# Patient Record
Sex: FEMALE | Race: WHITE | Employment: UNEMPLOYED | URBAN - METROPOLITAN AREA
[De-identification: names, ages, dates, MRNs, and addresses within clinical notes are randomized per-mention and may not be internally consistent; named-entity substitution may affect disease eponyms.]

---

## 2022-01-05 ENCOUNTER — HOSPITAL ENCOUNTER (OUTPATIENT)
Dept: PREADMISSION TESTING | Age: 57
Discharge: HOME | End: 2022-01-05
Payer: COMMERCIAL

## 2022-01-05 PROCEDURE — U0003 INFECTIOUS AGENT DETECTION BY NUCLEIC ACID (DNA OR RNA); SEVERE ACUTE RESPIRATORY SYNDROME CORONAVIRUS 2 (SARS-COV-2) (CORONAVIRUS DISEASE [COVID-19]), AMPLIFIED PROBE TECHNIQUE, MAKING USE OF HIGH THROUGHPUT TECHNOLOGIES AS DESCRIBED BY CMS-2020-01-R: HCPCS

## 2022-01-05 RX ORDER — ATROPINE SULFATE 0.1 MG/ML
0.5 INJECTION INTRAVENOUS
Status: CANCELLED | OUTPATIENT
Start: 2022-01-05 | End: 2022-01-06

## 2022-01-05 RX ORDER — DEXTROMETHORPHAN/PSEUDOEPHED 2.5-7.5/.8
1.2 DROPS ORAL
Status: CANCELLED | OUTPATIENT
Start: 2022-01-05

## 2022-01-05 RX ORDER — SODIUM CHLORIDE 0.9 % (FLUSH) 0.9 %
5-40 SYRINGE (ML) INJECTION AS NEEDED
Status: CANCELLED | OUTPATIENT
Start: 2022-01-05

## 2022-01-05 RX ORDER — SODIUM CHLORIDE 0.9 % (FLUSH) 0.9 %
5-40 SYRINGE (ML) INJECTION EVERY 8 HOURS
Status: CANCELLED | OUTPATIENT
Start: 2022-01-05

## 2022-01-05 RX ORDER — DIPHENHYDRAMINE HYDROCHLORIDE 50 MG/ML
50 INJECTION, SOLUTION INTRAMUSCULAR; INTRAVENOUS ONCE
Status: CANCELLED | OUTPATIENT
Start: 2022-01-05 | End: 2022-01-05

## 2022-01-05 RX ORDER — EPINEPHRINE 0.1 MG/ML
1 INJECTION INTRACARDIAC; INTRAVENOUS
Status: CANCELLED | OUTPATIENT
Start: 2022-01-05 | End: 2022-01-06

## 2022-01-07 LAB — SARS-COV-2, NAA: NOT DETECTED

## 2022-01-10 ENCOUNTER — HOSPITAL ENCOUNTER (OUTPATIENT)
Age: 57
Setting detail: OUTPATIENT SURGERY
Discharge: HOME | End: 2022-01-10
Attending: INTERNAL MEDICINE | Admitting: INTERNAL MEDICINE
Payer: COMMERCIAL

## 2022-01-10 VITALS
RESPIRATION RATE: 16 BRPM | HEART RATE: 109 BPM | OXYGEN SATURATION: 99 % | SYSTOLIC BLOOD PRESSURE: 114 MMHG | WEIGHT: 174.5 LBS | TEMPERATURE: 97.7 F | BODY MASS INDEX: 28.04 KG/M2 | DIASTOLIC BLOOD PRESSURE: 52 MMHG | HEIGHT: 66 IN

## 2022-01-10 LAB — GLUCOSE BLD STRIP.AUTO-MCNC: 134 MG/DL (ref 70–110)

## 2022-01-10 PROCEDURE — 74011250636 HC RX REV CODE- 250/636: Performed by: INTERNAL MEDICINE

## 2022-01-10 PROCEDURE — 76040000007: Performed by: INTERNAL MEDICINE

## 2022-01-10 PROCEDURE — 88302 TISSUE EXAM BY PATHOLOGIST: CPT

## 2022-01-10 PROCEDURE — G0500 MOD SEDAT ENDO SERVICE >5YRS: HCPCS | Performed by: INTERNAL MEDICINE

## 2022-01-10 PROCEDURE — 77030013991 HC SNR POLYP ENDOSC BSC -A: Performed by: INTERNAL MEDICINE

## 2022-01-10 PROCEDURE — 77030039961 HC KT CUST COLON BSC -D: Performed by: INTERNAL MEDICINE

## 2022-01-10 PROCEDURE — 2709999900 HC NON-CHARGEABLE SUPPLY: Performed by: INTERNAL MEDICINE

## 2022-01-10 PROCEDURE — 88305 TISSUE EXAM BY PATHOLOGIST: CPT

## 2022-01-10 PROCEDURE — 99153 MOD SED SAME PHYS/QHP EA: CPT | Performed by: INTERNAL MEDICINE

## 2022-01-10 PROCEDURE — 77030040361 HC SLV COMPR DVT MDII -B: Performed by: INTERNAL MEDICINE

## 2022-01-10 PROCEDURE — 82962 GLUCOSE BLOOD TEST: CPT

## 2022-01-10 RX ORDER — MIDAZOLAM HYDROCHLORIDE 1 MG/ML
.25-5 INJECTION, SOLUTION INTRAMUSCULAR; INTRAVENOUS
Status: DISCONTINUED | OUTPATIENT
Start: 2022-01-10 | End: 2022-01-10 | Stop reason: HOSPADM

## 2022-01-10 RX ORDER — NALOXONE HYDROCHLORIDE 0.4 MG/ML
0.4 INJECTION, SOLUTION INTRAMUSCULAR; INTRAVENOUS; SUBCUTANEOUS
Status: DISCONTINUED | OUTPATIENT
Start: 2022-01-10 | End: 2022-01-10 | Stop reason: HOSPADM

## 2022-01-10 RX ORDER — FLUMAZENIL 0.1 MG/ML
0.2 INJECTION INTRAVENOUS
Status: DISCONTINUED | OUTPATIENT
Start: 2022-01-10 | End: 2022-01-10 | Stop reason: HOSPADM

## 2022-01-10 RX ORDER — SODIUM CHLORIDE 9 MG/ML
1000 INJECTION, SOLUTION INTRAVENOUS CONTINUOUS
Status: DISCONTINUED | OUTPATIENT
Start: 2022-01-10 | End: 2022-01-10 | Stop reason: HOSPADM

## 2022-01-10 RX ORDER — CARBIDOPA AND LEVODOPA 25; 100 MG/1; MG/1
2 TABLET ORAL 4 TIMES DAILY
COMMUNITY

## 2022-01-10 RX ORDER — FENTANYL CITRATE 50 UG/ML
100 INJECTION, SOLUTION INTRAMUSCULAR; INTRAVENOUS
Status: DISCONTINUED | OUTPATIENT
Start: 2022-01-10 | End: 2022-01-10 | Stop reason: HOSPADM

## 2022-01-10 RX ADMIN — SODIUM CHLORIDE 1000 ML: 9 INJECTION, SOLUTION INTRAVENOUS at 07:47

## 2022-01-10 NOTE — PERIOP NOTES
Face to face  Discharged  instruction given to spouse/ pt  and agreed with the plan. Discharged includes diet, activity limitations &, medication to continue . D/c to home in stable condition with care of family.

## 2022-01-10 NOTE — PROCEDURES
[facility]  Colonoscopy Procedure Report  _______________________________________________________  Patient: Veto Perales                                        Attending Physician: Timoteo Min MD    Patient ID: [id number]                                    Referring Physician: James Romeo DO    Exam Date: 1/10/2022      Introduction: A  60 y.o. female patient, presents for inpatient Colonoscopy    Indications:  History of adenoma polyp on last colonoscopy done on 4/16/2014 by Dr Deana Wilkins:  a small sessile polyp found in the descending colon removed by cold biopsy polypectomy. She has one bm every one to two days. PMH: HTN, Parkinson disease, diabetic type one on insuline, lupus rosacea. S/p one C section frozen shoulder and hammer toe surgery. She would be having her second colonoscopy. Consent: The benefits, risks, and alternatives to the procedure were discussed and informed consent was obtained from the patient. Preparation: EKG, pulse, pulse oximetry and blood pressure were monitored throughout the procedure. ASA Classification: Class II- . The heart is an S1-S2 and regular heart rate and rhythm. Lungs are clear to auscultation and percussion. Abdomen is soft, nondistended, and nontender. Mental Status: awake, alert, and oriented to person, place, and time    Medications:  · Fentanyl 200 mcg IV and Versed 5 mg IV throughout the procedure. Rectal Exam: Normal Rectal Exam. No Blood. Pathology Specimens:  1    Procedure: The colonoscope was passed with difficulty through the anus under direct visualization and advanced to the cecum and 5 cm inside the terminal ileum. Retroflexion is made in the ascending colon. The patient required positioning on the back to aid in the passage of the scope. The scope was withdrawn and the mucosa was carefully examined. The quality of the preparation was good with  Presence of moderate amount of retained thick liquid stools .  The views were very good. The patient's toleration of the procedure was good. The exam was done twice to the cecum. Total time is 29 minutes and withdrawal time is 16 minutes. Findings:    Rectum:   Small internal hemorrhoids. Sigmoid:   Tortuous and loopy sigmoid. Descending Colon:   A tiny 3 mm flat descending colon polyp, cold snared. Transverse Colon:   Normal   Ascending Colon:   Normal  Cecum:   Normal  Terminal Ileum:   Normal.       Unplanned Events: There were no unplanned events. Estimated Blood Loss: None  IMPLANTS: * No implants in log *  Impressions: Difficult colonoscopy. Small internal hemorrhoids. Tortuous and loopy sigmoid. A tiny 3 mm flat descending colon polyp, cold snared. Normal Mucosa. No blood or AVM found. Complications: None; patient tolerated the procedure well. Recommendations:  · Discharge home when standard parameters are met. · Resume a high fiber diet. · Resume own medications. Avoid all NSAID's for 3 days  · Colonoscopy recommendation in 10 years.   · Take Miralax and/ or Colace 100 mg on regular basis if constipated    Procedure Codes:    Jerry Bhavyashimon [[id number]]    Endoscope Information:  Model Number(s)    [id number]   Assistant: None  Signed By: Agus Barroso MD Date: 1/10/2022

## 2022-01-10 NOTE — DISCHARGE INSTRUCTIONS
[address] from Nurse    PATIENT INSTRUCTIONS:    After general anesthesia or intravenous sedation, for 24 hours or while taking prescription Narcotics:  · Limit your activities  · Do not drive and operate hazardous machinery  · Do not make important personal or business decisions  · Do  not drink alcoholic beverages  · If you have not urinated within 8 hours after discharge, please contact your surgeon on call. Report the following to your surgeon:  · Excessive pain, swelling, redness or odor of or around the surgical area  · Temperature over 100.5  · Nausea and vomiting lasting longer than 4 hours or if unable to take medications  · Any signs of decreased circulation or nerve impairment to extremity: change in color, persistent  numbness, tingling, coldness or increase pain  · Any questions    What to do at Home:  Recommended activity: as above ,   If you experience any of the following symptoms as above , please follow up with Doctor Nava. *  Please give a list of your current medications to your Primary Care Provider. *  Please update this list whenever your medications are discontinued, doses are      changed, or new medications (including over-the-counter products) are added. *  Please carry medication information at all times in case of emergency situations. These are general instructions for a healthy lifestyle:    No smoking/ No tobacco products/ Avoid exposure to second hand smoke  Surgeon General's Warning:  Quitting smoking now greatly reduces serious risk to your health.     Obesity, smoking, and sedentary lifestyle greatly increases your risk for illness    A healthy diet, regular physical exercise & weight monitoring are important for maintaining a healthy lifestyle    You may be retaining fluid if you have a history of heart failure or if you experience any of the following symptoms:  Weight gain of 3 pounds or more overnight or 5 pounds in a week, increased swelling in our hands or feet or shortness of breath while lying flat in bed. Please call your doctor as soon as you notice any of these symptoms; do not wait until your next office visit. The discharge information has been reviewed with the patient and spouse. The patient and spouse verbalized understanding. Discharge medications reviewed with the patient and spouse and appropriate educational materials and side effects teaching were provided. ___________________________________________________________________________________________________________________________________  [id number]  [id number]    COLON DISCHARGE INSTRUCTIONS    Discomfort:  Redness at IV site- apply warm compress to area; if redness or soreness persist- contact your physician  There may be a slight amount of blood passed from the rectum  Gaseous discomfort- walking, belching will help relieve any discomfort  You may not operate a vehicle til the next day. You may not engage in an occupation involving machinery or appliances til the next day. You may not drink alcoholic beverages til the next day. DIET:   High fiber diet. ACTIVITY:  You may not  resume your normal daily activities til the next day. it is recommended that you spend the remainder of the day resting -  avoid any strenuous activity. CALL M.D.  IF ANY SIGN OF:   Increasing pain, nausea, vomiting  Abdominal distension (swelling)  New increased bleeding (oral or rectal)  Fever (chills)  Pain in chest area  Bloody discharge from nose or mouth  Shortness of breath    You may not  take any Advil, Aspirin, Ibuprofen, Motrin, Aleve, or Goodys for 5 days, ONLY  Tylenol as needed for pain. Post procedure diagnosis: tortuous colon. A tiny 3 mm flat descending colon polyp. Follow-up Instructions: Your follow up colonoscopy will be in 10 years. We will notify you the results of your biopsy by letter within 2 weeks.     Tita Alonso MD  January 10, 2022 Patient armband removed and shredded  Patient Education        Learning About COVID-19 and Flu Symptoms  How can you tell COVID-19 from the flu? COVID-19 and the flu have similar symptoms. The two can be hard to tell apart. The only way to know for sure which illness you have is to be tested. Since the symptoms are so alike, it makes sense to act as if you have COVID-19 until your test results come back. This means staying home and limiting contact with people in your home. You'll need to wash your hands often and disinfect surfaces that you touch. And be sure to wear a mask when you're around other people. This is also good advice if you think you have the flu. COVID-19 and the flu have these symptoms in common:  · Fever or chills  · Cough  · Shortness of breath  · Fatigue (tiredness)  · Sore throat  · Runny or stuffy nose  · Muscle and body aches  · Headache  · Vomiting and diarrhea (more common in children than adults)  COVID-19 has another symptom that also may occur:  · New loss of taste or smell  COVID-19 symptoms may appear from 2 to 14 days after infection. Flu symptoms usually appear 1 to 4 days after infection. Why should you get a flu shot during the COVID-19 pandemic? It's important to get your yearly flu vaccine. Both the flu and COVID-19 can be active at the same time. You can get sick with both infections at once. And having both may make you more sick than getting just one. The flu vaccine won't protect you from COVID-19. But it can help prevent the flu or reduce its symptoms. If fewer people get very ill with the flu, this will help free up medical resources that are needed for COVID-19 patients. Where can you learn more? Go to http://www.[employer].com/  Enter C123 in the search box to learn more about \"Learning About COVID-19 and Flu Symptoms. \"  Current as of: March 26, 2021               Content Version: 13.0  © 2015-8663 Healthwise, [facility].    Care instructions adapted under license by [employer] (which disclaims liability or warranty for this information). If you have questions about a medical condition or this instruction, always ask your healthcare professional. [address] any warranty or liability for your use of this information.

## 2022-01-10 NOTE — H&P
Assessment/Plan  # Detail Type Description    1. Assessment Personal history of colonic polyps (Z86.010). Impression Colonoscopy  4/16/2014 by Dr Leal Erp: for Fx hx of colon polyps. Normal cecum, ileocecal valve, ascending colon ,hepatic flexure, transverse colon, splenic flexure, sigmoid colon ,rectosigmoid junction, rectum, and anus. a single small sessile poly found in the descending colon removed by cold biopsy polypectomy. Colon descending biopsy: adenomatous polyp. Patient Plan She has average risk for colon cancer and asymptomatic. she has HTN, Parkinson disease, diabetic type one on insuline, lupus rosacea. She would be having her second colonoscopy. I explained to her the procedure of colonoscopy and the risks involved which include but not limited to reaction to sedation, bleeding, perforation, infection or missing a lesion if bowels are not well prepped or are unusually tortuous. she agreed to proceed with the procedure and answered her questions. I gave her the approved  to clean her bowels. I advised her to take if needed extra laxatives for few days before incase she is on the constipated side to assure adequate bowel prep              This 60year old  patient was referred by Alanis Whitehead. This 60year old female presents for Hx polyp/colon cancer. History of Present Illness  1. Hx polyp/colon cancer   Prior screening:  colonoscopy. Denies risk factors. Pertinent negatives include abdominal pain, change in bowel habits, constipation, decreased appetite, diarrhea, melena, nausea, vomiting and weight loss. Additional information: No family history of colon cancer and last colonoscopy 4/16/2014. Hx of polyp adenomatous. Bm 1 x daily.           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 The patient is right-handed. Preferred language is [place]. Education/Employment/Occupation  Employment History Status Retired Restrictions    part time work       [employer] insurance       [employer]       domestic        domestic  retired       Marital Status/Family/Social Support  Marital status:      Tobacco use status: Never smoked tobacco.    Smoking status: Never smoker. Tobacco Screening  Patient has never used tobacco. Patient has not used tobacco in the last 30 days. Patient has not used smokeless tobacco in the last 30 days. Smoking Status  Type Smoking Status Usage Per Day Years Used Pack Years Total Pack Years    Never smoker         Tobacco/Vaping Exposure  No passive smoke exposure. Alcohol  There is a history of alcohol use. Type: Wine. 2 drinks consumed occasionally. Caffeine  The patient uses caffeine: coffee and tea, soda. - 4 cups a day. Lifestyle  Sedentary activity level.     Sleep Patterns  Patient has no changes to sleep patterns. Medications (active prior to today)  Medication Instructions Start Date Stop Date Refilled Elsewhere   trazodone 50 mg tablet take 1 tablet by oral route  every day at bedtime 07/20/2017   N   Sinemet 25 mg-100 mg tablet take 2 tablet by oral route 3 times every day per neuro 06/19/2018   N   Suprep Bowel Prep Kit 17.5 gram-3.13 gram-1.6 gram oral solution take first half the evening before procedure and the second half the morning of the procedure 12/10/2019   N   RAMIPRIL CAP 10MG TAKE 2 CAPSULES(20MG) DAILY 01/11/2021 01/11/2021 N   Novolog U-100 Insulin aspart 100 unit/mL subcutaneous solution using up to 100 units a day SQ in an insulin pump. 01/29/2021 01/29/2021 N   Ozempic 0.25 mg or 0.5 mg (2 mg/1.5 mL) subcutaneous pen injector inject (0.25MG)  by subcutaneous route  every week for 4 weeks then inject 0.5 mg subcutaneously once weekly using rotating injectionsites 03/08/2021 03/08/2021 N   CONTOUR NEXT PAULY STRIPS TEST 6 TIMES A DAY DUE TO  INSULIN ADJUSTMENTS 05/17/2021 05/17/2021 N   paroxetine 10 mg tablet take 1 tablet by oral route  every day 06/15/2021   N   simvastatin 40 mg tablet TAKE 1 TABLET EVERY EVENING 06/15/2021  06/15/2021 N   triamcinolone acetonide 0.025 % topical cream apply by topical route 2 times every day a thin layer to the affected area(s) 06/15/2021   N       Medication Reconciliation  Medications reconciled today. Medications (Added, Continued or Stopped today)  Start Date Medication Directions PRN Status PRN Reason Instruction Stop Date   05/17/2021 CONTOUR NEXT PAULY STRIPS TEST 6 TIMES A DAY DUE TO  INSULIN ADJUSTMENTS N      01/29/2021 Novolog U-100 Insulin aspart 100 unit/mL subcutaneous solution using up to 100 units a day SQ in an insulin pump.  N      03/08/2021 Ozempic 0.25 mg or 0.5 mg (2 mg/1.5 mL) subcutaneous pen injector inject (0.25MG)  by subcutaneous route  every week for 4 weeks then inject 0.5 mg subcutaneously once weekly using rotating injectionsites N      06/15/2021 paroxetine 10 mg tablet take 1 tablet by oral route  every day N      01/11/2021 RAMIPRIL CAP 10MG TAKE 2 CAPSULES(20MG) DAILY N      06/15/2021 simvastatin 40 mg tablet TAKE 1 TABLET EVERY EVENING N      06/19/2018 Sinemet 25 mg-100 mg tablet take 2 tablet by oral route 3 times every day per neuro N      12/10/2019 Suprep Bowel Prep Kit 17.5 gram-3.13 gram-1.6 gram oral solution take first half the evening before procedure and the second half the morning of the procedure N      07/20/2017 trazodone 50 mg tablet take 1 tablet by oral route  every day at bedtime N      06/15/2021 triamcinolone acetonide 0.025 % topical cream apply by topical route 2 times every day a thin layer to the affected area(s) N        Allergies  Ingredient Reaction (Severity) Medication Name Comment   NO KNOWN ALLERGIES        Reviewed, no changes. Orders  Status Lab Order Time Frame Comments   result received BMP     result received HEMOGLOBIN A1C     result received Urine Microalbumin Creatnine Ratio     result received Lipid Measured LDL     result received NMR     result received SGPT     result received CPK     result received HEMOGLOBIN A1C     result received Lipid Measured LDL     result received NMR     result received HEMOGLOBIN A1C     result received SGPT     result received HEMOGLOBIN A1C     result received Lipid Measured LDL     result received NMR     result received HEMOGLOBIN A1C     ordered SGPT     ordered HEMOGLOBIN A1C     ordered Urine Microalbumin Creatnine Ratio     ordered Lipid Measured LDL     ordered NMR     result received HEMOGLOBIN A1C     result received Lipid Measured LDL     scheduled Referral: Rusty Vargas MD. Evaluate and treat.      ordered CMP     ordered TSH     ordered Urine Microalbumin Creatnine Ratio     ordered HEMOGLOBIN A1C     ordered HEMOGLOBIN A1C     ordered Mammogram Screening     ordered HEMOGLOBIN A1C     result received SGPT result received CPK     result received Urine Microalbumin Creatnine Ratio     result received Lipid Measured LDL     obtained HDx Follow Up     scheduled Referral: Rah Lerner MD FA. Consult.      obtained * Screening mammography digital     scheduled *Stress Echo     ordered BMP     ordered HEMOGLOBIN A1C     result received BMP     result received HEMOGLOBIN A1C     result received Urine Microalbumin Creatinine Ratio     ordered HEMOGLOBIN A1C     ordered TSH     result received CBC, Platelet: No Differential     result received Lipase, Serum AU     result received Comp Metabolic Panel (14) AU     obtained * ULTRASOUND OF ABDOMEN  Nothing to eat or drink 8 hours prior   result received Lipid with LDL/HDL Ratio AU     result received TSH     result received Urine Microalb/Creat ratio  Fasting: No   result received Hemoglobin A1c  Fasting: No   ordered TSH     ordered Thyroxine (T4) Free, Direct     ordered Hemoglobin A1c     ordered Basic Metabolic Panel (8) AU     result received CBC With Differential/Platelet     result received TSH     result received Comp Metabolic Panel (14) AU     obtained X-RAY EXAM OF RIBS/CHEST PA MIN 3 VIEWS     result received CBC With Differential/Platelet     result received Comp Metabolic Panel (14) AU     result received Lipase, Serum AU     obtained CHEST X-RAY PA & LAT Bilateral     ordered Basic Metabolic Panel (8) AU     ordered Urine Microalb/Creat ratio     ordered Magnesium, Serum AU     ordered Thyroxine (T4) Free, Direct     ordered TSH     ordered Hemoglobin A1c     ordered Hemoglobin A1c     result received Hemoglobin A1c  Fasting: No   obtained CT ANGIOGRAPHY, CHEST     result received CBC With Differential/Platelet     result received GGT  AU     result received Comp Metabolic Panel (14) AU     ordered CHEST X-RAY PA & LAT     ordered X-RAY EXAM OF THORACIC SPINE 3 VIEWS     result received CHEST X-RAY PA & LAT     result received X-RAY EXAM THORACIC SPINE 2 VIEWS scheduled Referrals: Pulmonology. Beny Mari MD. Evaluate and treat     scheduled Referrals: Cardiology. Wing Amanda MD. Evaluate and treat     result received Creatine Kinase,Total,Serum     result received Sedimentation Rate-Westergren     result received CBC With Differential/Platelet     result received Hepatitis Panel (4)     result received Comp. Metabolic Panel (14)     result received Alk Phosphatase, Bone Specific     result received Ferritin, Serum     result received Antinuclear Ab Reflex Hamblen     ordered Sedimentation Rate-Westergren     ordered Hepatobiliary Sys Imag W/GB W/pharm Intervention ASAP    scheduled Referrals: Rheumatology. Jamal Granado MD. Evaluate and treat     result received Mitochondrial (M2) Antibody     result received Alpha-1-Antitrypsin, Serum     result received Ceruloplasmin     ordered Anti Smooth Muscle Antibody (ASMA)     result received Hepatic Function Panel (7)     scheduled ULTRASOUND OF ABDOMEN     scheduled Referrals: Gastroenterology. Mike Chen MD. Evaluate and treat     result received US EXAM, ABDOM, COMPLETE     scheduled Referrals: Rheumatology. Jamal Granado MD. Location: Rheumatology.  Evaluate and treat     ordered C-Reactive Protein     ordered Sedimentation Rate-Westergren     ordered Rheumatoid Factor     ordered Urinalysis, Complete     ordered Uric Acid, Serum AU     result received CCP Antibodies IgG/IgA     result received Antinuclear Antibodies, IFA     result received Complement C3, Serum     result received Complement C4, Serum     result received Protein and Creatinine, Random Urine     result received Uric Acid, Serum AU     result received Sedimentation Rate-Westergren     result received CRP     result received Urinalysis, Routine     result received Rheumatoid Factor     result received UA Microscopic     result received TSH  Fasting: No   result received Hemoglobin A1c  Fasting: No   result received Thyroxine (T4) Free, Direct  Fasting: No   result received Magnesium, Serum AU  Fasting: No   result received Urine Microalb/Creat ratio  Fasting: No   result received Basic Metabolic Panel (8) AU  Fasting: No   ordered Hemoglobin A1c     ordered Bone Imaging, Whole Body     result received Hepatic Function Panel (7) AU     result received Screening mammography digital     result received Hemoglobin A1c  Fasting: No   ordered Hemoglobin A1c     ordered TSH     ordered Urine Microalb/Creat ratio     ordered Magnesium, Serum AU     ordered Basic Metabolic Panel (8) AU     result received CBC With Differential/Platelet     result received TSH     ordered CBC Manual Differential     result received Lipid with LDL/HDL Ratio AU     result received Comp Metabolic Panel (14) AU     result received TSH     result received CBC With Differential/Platelet     result received Comp. Metabolic Panel (14)     result received Lipid Panel With LDL/HDL Ratio     result received Comp Metabolic Panel (14) AU     result received GGT  AU     result received Sedimentation Rate-Westergren     ordered MRI BRAIN W/O & W/DYE     scheduled Referrals: Orthopedics. [employer]. Evaluate and treat     ordered US, Extremity Not Venous     result received Urine Microalb/Creat ratio     result received Magnesium, Serum AU     result received Hemoglobin A1c     result received TSH     result received Basic Metabolic Panel (8) AU     result received Urine Microalb/Creat ratio     result received Hemoglobin A1c     scheduled Referrals: Neurology. Bee Carey MD. Evaluate and treat     ordered EMG Of ONE EXTREMITY     ordered MRI CERVICAL SPINE W/O DYE     scheduled Referrals:  Physical Therapy. Evaluate and treat     scheduled Referrals: Neurology. Evaluate and treat  [facility] for a second opinion.    result received Basic Metabolic Panel (8) AU     result received TSH     result received Hemoglobin A1c     result received Hemoglobin A1c result received Urine Microalb/Creat ratio     result received Hemoglobin A1c     result received Basic Metabolic Panel (8) AU     ordered Dexa, Bone Density Scan     result received Comp Metabolic Panel (14) AU     result received TSH     result received Hemoglobin A1c     result received Lipid with LDL/HDL Ratio AU     result received CBC With Differential/Platelet     result received Alkaline Phosphatase, S     result received GGT     result received Alk Phosphatase, Bone Specific     ordered X-RAY OF PELVIS     ordered X-RAY LOWER SPINE, 2/3 Views     result received Dexa, Bone Density Scan     scheduled Referrals: Otolaryngology. Renee Issa MD. Evaluate and treat     result received Screening mammography digital     ordered Comp Metabolic Panel (14) AU     result received X-RAY OF PELVIS     result received X-RAY LOWER SPINE, 2/3 Views     result received TSH     result received Basic Metabolic Panel (8) AU     result received Thyroxine (T4) Free, Direct     result received Hemoglobin A1c     result received Lipid Panel calc LDL     result received Comp Metabolic Panel (14) AU     result received CBC With Differential/Platelet     scheduled Referrals: Otolaryngology.  Olen Scheuermann MD. Evaluate and treat     ordered Hemoglobin A1c     ordered Urine Microalb/Creat ratio     ordered MRI BRAIN W/O & W/DYE     result received Hemoglobin A1c     result received Urine Microalb/Creat ratio     ordered Magnesium, Serum AU     ordered Hemoglobin A1c     ordered Vitamin B12     ordered TSH     ordered Basic Metabolic Panel (8) AU     ordered Hemoglobin A1c     ordered Magnesium, Serum AU     ordered Urine Microalb/Creat ratio     ordered Lipid Panel calc LDL     ordered Comp Metabolic Panel (14) AU     ordered TSH     ordered Lipid Panel calc LDL     ordered Hemoglobin A1c     ordered CBC With Differential/Platelet     ordered Magnesium, Serum AU     ordered Comp Metabolic Panel (14) AU     ordered Hemoglobin A1c ordered Referrals: Dermatology. Yumiko Young MD. Evaluate and treat     ordered Referrals: Gastroenterology. Marbin Johnson MD. Evaluate and treat     ordered Dexa, Bone Density Scan     ordered Screening Mammography Digital     result received Pap Lb, HPV-hr     result received Screening Mammography Digital     ordered GASTROENTEROLOGY PROCEDURE within 1 Month at location [facility]. The surgeon scheduled is Simona Underwood MD. An assistant has not been requested. result received Lipid Panel With LDL/HDL Ratio     result received Microalb/Creat Ratio, Randm Ur     result received Hemoglobin A1c     result received Magnesium, Serum     ordered CBC With Differential/Platelet     ordered Comp Metabolic Panel (14) AU     ordered TSH     ordered Hemoglobin A1c     ordered Lipid Panel calc LDL     result received Hemoglobin A1c     ordered Hemoglobin A1c     ordered Hemoglobin A1c     ordered TSH     ordered Comp Metabolic Panel (14) AU     ordered Magnesium, Serum AU     ordered Urine Microalb/Creat ratio     ordered Lipid Panel calc LDL     ordered Hemoglobin A1c     ordered Hemoglobin A1c     ordered Lipid Panel calc LDL     ordered Magnesium, Serum AU     ordered Urine Microalb/Creat ratio     ordered Comp Metabolic Panel (14) AU     ordered TSH     ordered CBC With Differential/Platelet     ordered Comp Metabolic Panel (14) AU     ordered TSH     ordered Lipid Panel calc LDL     ordered Urine Microalb/Creat ratio     ordered UA In Office No Micro     ordered Referrals: Gastroenterology. Evaluate and treat     result received Dexa, Bone Density Scan     result received Screening Mammography Digital     ordered Hemoglobin A1c         Review of Systems  System Neg/Pos Details   Constitutional Negative Chills, Fever, Malaise and Weight loss. ENMT Negative Ear infections, Nasal congestion, Sinus Infection and Sore throat. Eyes Negative Double vision and Eye pain.    Respiratory Negative Asthma, Chronic cough, Dyspnea, Pleuritic pain and Wheezing. Cardio Negative Chest pain, Edema and Irregular heartbeat/palpitations. GI Negative Abdominal pain, Change in bowel habits, Constipation, Decreased appetite, Diarrhea, Dysphagia, Heartburn, Hematemesis, Hematochezia, Melena, Nausea, Reflux and Vomiting.  Negative Dysuria, Hematuria, Urinary frequency, Urinary incontinence and Urinary retention. Endocrine Negative Cold intolerance, Heat intolerance and Increased thirst.   Neuro Negative Dizziness, Headache, Numbness, Tremors and Vertigo. Psych Negative Anxiety, Depression and Increased stress. Integumentary Negative Hives, Pruritus and Rash. MS Negative Back pain, Joint pain and Myalgia. Hema/Lymph Negative Easy bleeding, Easy bruising and Lymphadenopathy. Reproductive Negative Breast lumps, Breast pain and Vaginal discharge. Vital Signs   Height  Time ft in cm Last Measured Height Position   4:23 PM    10/05/2021      Weight/BSA/BMI  Time lb oz kg Context BMI kg/m2 BSA m2   4:23 PM    dressed with shoes     Vitals (last day)    Date/Time Temp Pulse BP Cardiac Rhythm Who   01/10/22 0745 97.7 °F (36.5 °C) 101 Abnormal  152/75 Abnormal   HE       Respiratory Therapy (last day)    Date/Time Resp SpO2 O2 Device O2 Flow Rate (L/min) Who   01/10/22 0745 18 98 % None (Room air)  HE       Physical  Exam  Exam Findings Details   Constitutional * Nourishment - obese. Constitutional Comments the patient couldn't wait and to go quickly as she had an appointment   Constitutional Normal No acute distress. Well developed.    Immunizations Entered by History  Date Immunization   9/24/2013 11:44:00 AM Flu (split) (3 yrs or older)       Active Patient Care Team Members  Name Contact Agency Type Support Role Relationship Active Date Inactive Date Specialty   Darby Zamora   Patient provider PCP   [address] Juliann Mireles   Emergency Contact Mother      Speedy Morris   encounter provider Podiatry   Neda Morejon   primary care provider       Behzad De La Torre   encounter provider       Donato Crawley   encounter provider    Gastroenterology   [address] [place]   encounter provider    Gastroenterology       No change in H&P

## 2022-01-10 NOTE — H&P
Assessment/Plan  # Detail Type Description    1. Assessment Personal history of colonic polyps (Z86.010). Impression Colonoscopy  4/16/2014 by Dr Gifty Pelayo: for Fx hx of colon polyps. Normal cecum, ileocecal valve, ascending colon ,hepatic flexure, transverse colon, splenic flexure, sigmoid colon ,rectosigmoid junction, rectum, and anus. a single small sessile poly found in the descending colon removed by cold biopsy polypectomy. Colon descending biopsy: adenomatous polyp. Patient Plan She has average risk for colon cancer and asymptomatic. she has HTN, Parkinson disease, diabetic type one on insuline, lupus rosacea. She would be having her second colonoscopy. I explained to her the procedure of colonoscopy and the risks involved which include but not limited to reaction to sedation, bleeding, perforation, infection or missing a lesion if bowels are not well prepped or are unusually tortuous. she agreed to proceed with the procedure and answered her questions. I gave her the approved  to clean her bowels. I advised her to take if needed extra laxatives for few days before incase she is on the constipated side to assure adequate bowel prep              This 60year old  patient was referred by Ginna Richey. This 60year old female presents for Hx polyp/colon cancer. History of Present Illness  1. Hx polyp/colon cancer   Prior screening:  colonoscopy. Denies risk factors. Pertinent negatives include abdominal pain, change in bowel habits, constipation, decreased appetite, diarrhea, melena, nausea, vomiting and weight loss. Additional information: No family history of colon cancer and last colonoscopy 4/16/2014. Hx of polyp adenomatous. Bm 1 x daily.           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 The patient is right-handed. Preferred language is [place]. Education/Employment/Occupation  Employment History Status Retired Restrictions    part time work       state [employer] insurance       [employer]       domestic        domestic  retired       Marital Status/Family/Social Support  Marital status:      Tobacco use status: Never smoked tobacco.    Smoking status: Never smoker. Tobacco Screening  Patient has never used tobacco. Patient has not used tobacco in the last 30 days. Patient has not used smokeless tobacco in the last 30 days. Smoking Status  Type Smoking Status Usage Per Day Years Used Pack Years Total Pack Years    Never smoker         Tobacco/Vaping Exposure  No passive smoke exposure. Alcohol  There is a history of alcohol use. Type: Wine. 2 drinks consumed occasionally. Caffeine  The patient uses caffeine: coffee and tea, soda. - 4 cups a day. Lifestyle  Sedentary activity level.     Sleep Patterns  Patient has no changes to sleep patterns. Medications (active prior to today)  Medication Instructions Start Date Stop Date Refilled Elsewhere   trazodone 50 mg tablet take 1 tablet by oral route  every day at bedtime 07/20/2017   N   Sinemet 25 mg-100 mg tablet take 2 tablet by oral route 3 times every day per neuro 06/19/2018   N   Suprep Bowel Prep Kit 17.5 gram-3.13 gram-1.6 gram oral solution take first half the evening before procedure and the second half the morning of the procedure 12/10/2019   N   RAMIPRIL CAP 10MG TAKE 2 CAPSULES(20MG) DAILY 01/11/2021 01/11/2021 N   Novolog U-100 Insulin aspart 100 unit/mL subcutaneous solution using up to 100 units a day SQ in an insulin pump. 01/29/2021 01/29/2021 N   Ozempic 0.25 mg or 0.5 mg (2 mg/1.5 mL) subcutaneous pen injector inject (0.25MG)  by subcutaneous route  every week for 4 weeks then inject 0.5 mg subcutaneously once weekly using rotating injectionsites 03/08/2021 03/08/2021 N   CONTOUR NEXT PAULY STRIPS TEST 6 TIMES A DAY DUE TO  INSULIN ADJUSTMENTS 05/17/2021 05/17/2021 N   paroxetine 10 mg tablet take 1 tablet by oral route  every day 06/15/2021   N   simvastatin 40 mg tablet TAKE 1 TABLET EVERY EVENING 06/15/2021  06/15/2021 N   triamcinolone acetonide 0.025 % topical cream apply by topical route 2 times every day a thin layer to the affected area(s) 06/15/2021   N       Medication Reconciliation  Medications reconciled today. Medications (Added, Continued or Stopped today)  Start Date Medication Directions PRN Status PRN Reason Instruction Stop Date   05/17/2021 CONTOUR NEXT PAULY STRIPS TEST 6 TIMES A DAY DUE TO  INSULIN ADJUSTMENTS N      01/29/2021 Novolog U-100 Insulin aspart 100 unit/mL subcutaneous solution using up to 100 units a day SQ in an insulin pump.  N      03/08/2021 Ozempic 0.25 mg or 0.5 mg (2 mg/1.5 mL) subcutaneous pen injector inject (0.25MG)  by subcutaneous route  every week for 4 weeks then inject 0.5 mg subcutaneously once weekly using rotating injectionsites N      06/15/2021 paroxetine 10 mg tablet take 1 tablet by oral route  every day N      01/11/2021 RAMIPRIL CAP 10MG TAKE 2 CAPSULES(20MG) DAILY N      06/15/2021 simvastatin 40 mg tablet TAKE 1 TABLET EVERY EVENING N      06/19/2018 Sinemet 25 mg-100 mg tablet take 2 tablet by oral route 3 times every day per neuro N      12/10/2019 Suprep Bowel Prep Kit 17.5 gram-3.13 gram-1.6 gram oral solution take first half the evening before procedure and the second half the morning of the procedure N      07/20/2017 trazodone 50 mg tablet take 1 tablet by oral route  every day at bedtime N      06/15/2021 triamcinolone acetonide 0.025 % topical cream apply by topical route 2 times every day a thin layer to the affected area(s) N        Allergies  Ingredient Reaction (Severity) Medication Name Comment   NO KNOWN ALLERGIES        Reviewed, no changes. Orders  Status Lab Order Time Frame Comments   result received BMP     result received HEMOGLOBIN A1C     result received Urine Microalbumin Creatnine Ratio     result received Lipid Measured LDL     result received NMR     result received SGPT     result received CPK     result received HEMOGLOBIN A1C     result received Lipid Measured LDL     result received NMR     result received HEMOGLOBIN A1C     result received SGPT     result received HEMOGLOBIN A1C     result received Lipid Measured LDL     result received NMR     result received HEMOGLOBIN A1C     ordered SGPT     ordered HEMOGLOBIN A1C     ordered Urine Microalbumin Creatnine Ratio     ordered Lipid Measured LDL     ordered NMR     result received HEMOGLOBIN A1C     result received Lipid Measured LDL     scheduled Referral: Jovani Sesay MD. Evaluate and treat.      ordered CMP     ordered TSH     ordered Urine Microalbumin Creatnine Ratio     ordered HEMOGLOBIN A1C     ordered HEMOGLOBIN A1C     ordered Mammogram Screening     ordered HEMOGLOBIN A1C     result received SGPT result received CPK     result received Urine Microalbumin Creatnine Ratio     result received Lipid Measured LDL     obtained HDx Follow Up     scheduled Referral: Alicia Gonzalez MD FA. Consult.      obtained * Screening mammography digital     scheduled *Stress Echo     ordered BMP     ordered HEMOGLOBIN A1C     result received BMP     result received HEMOGLOBIN A1C     result received Urine Microalbumin Creatinine Ratio     ordered HEMOGLOBIN A1C     ordered TSH     result received CBC, Platelet: No Differential     result received Lipase, Serum AU     result received Comp Metabolic Panel (14) AU     obtained * ULTRASOUND OF ABDOMEN  Nothing to eat or drink 8 hours prior   result received Lipid with LDL/HDL Ratio AU     result received TSH     result received Urine Microalb/Creat ratio  Fasting: No   result received Hemoglobin A1c  Fasting: No   ordered TSH     ordered Thyroxine (T4) Free, Direct     ordered Hemoglobin A1c     ordered Basic Metabolic Panel (8) AU     result received CBC With Differential/Platelet     result received TSH     result received Comp Metabolic Panel (14) AU     obtained X-RAY EXAM OF RIBS/CHEST PA MIN 3 VIEWS     result received CBC With Differential/Platelet     result received Comp Metabolic Panel (14) AU     result received Lipase, Serum AU     obtained CHEST X-RAY PA & LAT Bilateral     ordered Basic Metabolic Panel (8) AU     ordered Urine Microalb/Creat ratio     ordered Magnesium, Serum AU     ordered Thyroxine (T4) Free, Direct     ordered TSH     ordered Hemoglobin A1c     ordered Hemoglobin A1c     result received Hemoglobin A1c  Fasting: No   obtained CT ANGIOGRAPHY, CHEST     result received CBC With Differential/Platelet     result received GGT  AU     result received Comp Metabolic Panel (14) AU     ordered CHEST X-RAY PA & LAT     ordered X-RAY EXAM OF THORACIC SPINE 3 VIEWS     result received CHEST X-RAY PA & LAT     result received X-RAY EXAM THORACIC SPINE 2 VIEWS scheduled Referrals: Pulmonology. Edilberto Ralph MD. Evaluate and treat     scheduled Referrals: Cardiology. Chuck Lewis MD. Evaluate and treat     result received Creatine Kinase,Total,Serum     result received Sedimentation Rate-Westergren     result received CBC With Differential/Platelet     result received Hepatitis Panel (4)     result received Comp. Metabolic Panel (14)     result received Alk Phosphatase, Bone Specific     result received Ferritin, Serum     result received Antinuclear Ab Reflex Ellis     ordered Sedimentation Rate-Westergren     ordered Hepatobiliary Sys Imag W/GB W/pharm Intervention ASAP    scheduled Referrals: Rheumatology. Aniket Dimas MD. Evaluate and treat     result received Mitochondrial (M2) Antibody     result received Alpha-1-Antitrypsin, Serum     result received Ceruloplasmin     ordered Anti Smooth Muscle Antibody (ASMA)     result received Hepatic Function Panel (7)     scheduled ULTRASOUND OF ABDOMEN     scheduled Referrals: Gastroenterology. Enedelia Reyna MD. Evaluate and treat     result received US EXAM, ABDOM, COMPLETE     scheduled Referrals: Rheumatology. Aniket Dimas MD. Location: Rheumatology.  Evaluate and treat     ordered C-Reactive Protein     ordered Sedimentation Rate-Westergren     ordered Rheumatoid Factor     ordered Urinalysis, Complete     ordered Uric Acid, Serum AU     result received CCP Antibodies IgG/IgA     result received Antinuclear Antibodies, IFA     result received Complement C3, Serum     result received Complement C4, Serum     result received Protein and Creatinine, Random Urine     result received Uric Acid, Serum AU     result received Sedimentation Rate-Westergren     result received CRP     result received Urinalysis, Routine     result received Rheumatoid Factor     result received UA Microscopic     result received TSH  Fasting: No   result received Hemoglobin A1c  Fasting: No   result received Thyroxine (T4) Free, Direct  Fasting: No   result received Magnesium, Serum AU  Fasting: No   result received Urine Microalb/Creat ratio  Fasting: No   result received Basic Metabolic Panel (8) AU  Fasting: No   ordered Hemoglobin A1c     ordered Bone Imaging, Whole Body     result received Hepatic Function Panel (7) AU     result received Screening mammography digital     result received Hemoglobin A1c  Fasting: No   ordered Hemoglobin A1c     ordered TSH     ordered Urine Microalb/Creat ratio     ordered Magnesium, Serum AU     ordered Basic Metabolic Panel (8) AU     result received CBC With Differential/Platelet     result received TSH     ordered CBC Manual Differential     result received Lipid with LDL/HDL Ratio AU     result received Comp Metabolic Panel (14) AU     result received TSH     result received CBC With Differential/Platelet     result received Comp. Metabolic Panel (14)     result received Lipid Panel With LDL/HDL Ratio     result received Comp Metabolic Panel (14) AU     result received GGT  AU     result received Sedimentation Rate-Westergren     ordered MRI BRAIN W/O & W/DYE     scheduled Referrals: Orthopedics. [employer]. Evaluate and treat     ordered US, Extremity Not Venous     result received Urine Microalb/Creat ratio     result received Magnesium, Serum AU     result received Hemoglobin A1c     result received TSH     result received Basic Metabolic Panel (8) AU     result received Urine Microalb/Creat ratio     result received Hemoglobin A1c     scheduled Referrals: Neurology. Rosi Rachel MD. Evaluate and treat     ordered EMG Of ONE EXTREMITY     ordered MRI CERVICAL SPINE W/O DYE     scheduled Referrals:  Physical Therapy. Evaluate and treat     scheduled Referrals: Neurology. Evaluate and treat  [facility] for a second opinion.    result received Basic Metabolic Panel (8) AU     result received TSH     result received Hemoglobin A1c     result received Hemoglobin A1c result received Urine Microalb/Creat ratio     result received Hemoglobin A1c     result received Basic Metabolic Panel (8) AU     ordered Dexa, Bone Density Scan     result received Comp Metabolic Panel (14) AU     result received TSH     result received Hemoglobin A1c     result received Lipid with LDL/HDL Ratio AU     result received CBC With Differential/Platelet     result received Alkaline Phosphatase, S     result received GGT     result received Alk Phosphatase, Bone Specific     ordered X-RAY OF PELVIS     ordered X-RAY LOWER SPINE, 2/3 Views     result received Dexa, Bone Density Scan     scheduled Referrals: Otolaryngology. Tab Paul MD. Evaluate and treat     result received Screening mammography digital     ordered Comp Metabolic Panel (14) AU     result received X-RAY OF PELVIS     result received X-RAY LOWER SPINE, 2/3 Views     result received TSH     result received Basic Metabolic Panel (8) AU     result received Thyroxine (T4) Free, Direct     result received Hemoglobin A1c     result received Lipid Panel calc LDL     result received Comp Metabolic Panel (14) AU     result received CBC With Differential/Platelet     scheduled Referrals: Otolaryngology.  Jean Marie Solo MD. Evaluate and treat     ordered Hemoglobin A1c     ordered Urine Microalb/Creat ratio     ordered MRI BRAIN W/O & W/DYE     result received Hemoglobin A1c     result received Urine Microalb/Creat ratio     ordered Magnesium, Serum AU     ordered Hemoglobin A1c     ordered Vitamin B12     ordered TSH     ordered Basic Metabolic Panel (8) AU     ordered Hemoglobin A1c     ordered Magnesium, Serum AU     ordered Urine Microalb/Creat ratio     ordered Lipid Panel calc LDL     ordered Comp Metabolic Panel (14) AU     ordered TSH     ordered Lipid Panel calc LDL     ordered Hemoglobin A1c     ordered CBC With Differential/Platelet     ordered Magnesium, Serum AU     ordered Comp Metabolic Panel (14) AU     ordered Hemoglobin A1c ordered Referrals: Dermatology. Bradley Bahean MD. Evaluate and treat     ordered Referrals: Gastroenterology. Huey Isaacs MD. Evaluate and treat     ordered Dexa, Bone Density Scan     ordered Screening Mammography Digital     result received Pap Lb, HPV-hr     result received Screening Mammography Digital     ordered GASTROENTEROLOGY PROCEDURE within 1 Month at location [facility]. The surgeon scheduled is Maribel Moore MD. An assistant has not been requested. result received Lipid Panel With LDL/HDL Ratio     result received Microalb/Creat Ratio, Randm Ur     result received Hemoglobin A1c     result received Magnesium, Serum     ordered CBC With Differential/Platelet     ordered Comp Metabolic Panel (14) AU     ordered TSH     ordered Hemoglobin A1c     ordered Lipid Panel calc LDL     result received Hemoglobin A1c     ordered Hemoglobin A1c     ordered Hemoglobin A1c     ordered TSH     ordered Comp Metabolic Panel (14) AU     ordered Magnesium, Serum AU     ordered Urine Microalb/Creat ratio     ordered Lipid Panel calc LDL     ordered Hemoglobin A1c     ordered Hemoglobin A1c     ordered Lipid Panel calc LDL     ordered Magnesium, Serum AU     ordered Urine Microalb/Creat ratio     ordered Comp Metabolic Panel (14) AU     ordered TSH     ordered CBC With Differential/Platelet     ordered Comp Metabolic Panel (14) AU     ordered TSH     ordered Lipid Panel calc LDL     ordered Urine Microalb/Creat ratio     ordered UA In Office No Micro     ordered Referrals: Gastroenterology. Evaluate and treat     result received Dexa, Bone Density Scan     result received Screening Mammography Digital     ordered Hemoglobin A1c         Review of Systems  System Neg/Pos Details   Constitutional Negative Chills, Fever, Malaise and Weight loss. ENMT Negative Ear infections, Nasal congestion, Sinus Infection and Sore throat. Eyes Negative Double vision and Eye pain.    Respiratory Negative Asthma, Chronic cough, Dyspnea, Pleuritic pain and Wheezing. Cardio Negative Chest pain, Edema and Irregular heartbeat/palpitations. GI Negative Abdominal pain, Change in bowel habits, Constipation, Decreased appetite, Diarrhea, Dysphagia, Heartburn, Hematemesis, Hematochezia, Melena, Nausea, Reflux and Vomiting.  Negative Dysuria, Hematuria, Urinary frequency, Urinary incontinence and Urinary retention. Endocrine Negative Cold intolerance, Heat intolerance and Increased thirst.   Neuro Negative Dizziness, Headache, Numbness, Tremors and Vertigo. Psych Negative Anxiety, Depression and Increased stress. Integumentary Negative Hives, Pruritus and Rash. MS Negative Back pain, Joint pain and Myalgia. Hema/Lymph Negative Easy bleeding, Easy bruising and Lymphadenopathy. Reproductive Negative Breast lumps, Breast pain and Vaginal discharge. Vital Signs   Height  Time ft in cm Last Measured Height Position   4:23 PM    10/05/2021      Weight/BSA/BMI  Time lb oz kg Context BMI kg/m2 BSA m2   4:23 PM    dressed with shoes     Vitals (last day)    Date/Time Temp Pulse BP Cardiac Rhythm Who   01/10/22 0745 97.7 °F (36.5 °C) 101 Abnormal  152/75 Abnormal   HE       Respiratory Therapy (last day)    Date/Time Resp SpO2 O2 Device O2 Flow Rate (L/min) Who   01/10/22 0745 18 98 % None (Room air)  HE       Physical  Exam  Exam Findings Details   Constitutional * Nourishment - obese. Constitutional Comments the patient couldn't wait and to go quickly as she had an appointment   Constitutional Normal No acute distress. Well developed.    Immunizations Entered by History  Date Immunization   9/24/2013 11:44:00 AM Flu (split) (3 yrs or older)       Active Patient Care Team Members  Name Contact Agency Type Support Role Relationship Active Date Inactive Date Specialty   Gómez Barroso   Patient provider PCP   Via Juliann Mireles   Emergency Contact Mother      Chidi Tahir   encounter provider Podiatry   Bishop Garcia   primary care provider       Berry Mcnulty   encounter provider       Dahiana Neal   encounter provider    Gastroenterology   [facility] [address] [facility]   encounter provider    Gastroenterology       No change in H&P

## 2022-03-04 ENCOUNTER — HOSPITAL ENCOUNTER (OUTPATIENT)
Dept: PREADMISSION TESTING | Age: 57
Discharge: HOME | End: 2022-03-04

## 2022-03-04 VITALS — BODY MASS INDEX: 27 KG/M2 | HEIGHT: 66 IN | WEIGHT: 168 LBS

## 2022-03-04 RX ORDER — KETOROLAC TROMETHAMINE 5 MG/ML
1 SOLUTION OPHTHALMIC
Status: CANCELLED | OUTPATIENT
Start: 2022-03-04

## 2022-03-04 RX ORDER — SODIUM CHLORIDE, SODIUM LACTATE, POTASSIUM CHLORIDE, CALCIUM CHLORIDE 600; 310; 30; 20 MG/100ML; MG/100ML; MG/100ML; MG/100ML
75 INJECTION, SOLUTION INTRAVENOUS CONTINUOUS
Status: CANCELLED | OUTPATIENT
Start: 2022-03-04

## 2022-03-04 RX ORDER — PHENYLEPHRINE HYDROCHLORIDE 25 MG/ML
1 SOLUTION/ DROPS OPHTHALMIC
Status: CANCELLED | OUTPATIENT
Start: 2022-03-04 | End: 2022-03-04

## 2022-03-04 RX ORDER — TROPICAMIDE 10 MG/ML
1 SOLUTION/ DROPS OPHTHALMIC
Status: CANCELLED | OUTPATIENT
Start: 2022-03-04 | End: 2022-03-04

## 2022-03-04 NOTE — PERIOP NOTES
PAT - SURGICAL PRE-ADMISSION INSTRUCTIONS    NAME:  Kandi Kong                                                          TODAY'S DATE:  3/4/2022    SURGERY DATE:  3/8/2022                                  SURGERY ARRIVAL TIME:   TBA    1. Do NOT eat or drink anything, including candy or gum, after MIDNIGHT on 3/8/2022 , unless you have specific instructions from your Surgeon or Anesthesia Provider to do so. 2. No smoking 24 hours before surgery. 3. No alcohol 24 hours prior to the day of surgery. 4. No recreational drugs for one week prior to the day of surgery. 5. Leave all valuables, including money/purse, at home. 6. Remove all jewelry, nail polish, makeup (including mascara); no lotions, powders, deodorant, or perfume/cologne/after shave. 7. Glasses/Contact lenses and Dentures may be worn to the hospital.  They will be removed prior to surgery. 8. Call your doctor if symptoms of a cold or illness develop within 24 ours prior to surgery. 9. AN ADULT MUST DRIVE YOU HOME AFTER OUTPATIENT SURGERY. 10. If you are having an OUTPATIENT procedure, please make arrangements for a responsible adult to be with you for 24 hours after your surgery. 11. If you are admitted to the hospital, you will be assigned to a bed after surgery is complete. Normally a family member will not be able to see you until you are in your assigned bed. 12. Visitation Restrictions Explained. Special Instructions:  Covid Test done 3/2/2020 with negative result. Patient vaccinated and on media . Quarantine requirements discussed. Has Advanced Directive will try and bring DOS . No DNR  Follow physician instructions about insulin. If NO instructions were given, take your usual dose of BASAL insulin the night BEFORE surgery.     Patient Prep:    shower with anti-bacterial soap     These surgical instructions were reviewed with patient during the PAT phone call

## 2022-03-04 NOTE — DIABETES MGMT
INSULIN PUMP CONSULT/ Plan Of Care    How long have you had diabetes? 30 years   How long have you had an insulin pump? 20 years  What is your blood glucose target? 120-150  How often do you usually test your blood glucose in a day? 4-6 x daily  What was your most recent A1C and date?  7.2% (2 weeks ago - drawn at Endo appointment)  Who is your endocrinologist?   Eveline Tran PA-C, [facility], [facility]. Endocrinology  When was your last visit to your endocrinologist?  2 weeks ago    INSULIN PUMP    Brand of pump and model #:   Medtronic 670G   Type of insulin used   Novolog   What are your basal rate(s)? unsure   What is your sensitivity factor?   unsure   What is your insulin to carbohydrate ratio?   unsure   What conventional insulin dose do you use if you pump were inoperable? (\"off pump plan\")   None     Do you often have hypoglycemia? Not usually - occasionally in early AM  How do you treat hypoglycemia? Juice, followed by food  Do you have any site problems? No  Do you feel able and confident to manage your pump while here? Yes, plans to disconnect right before procedure (will leave pump with her , Jamil Purdy), and reconnect as soon as she is able post procedure. Who helps you manage your insulin pump when you are not able?   N/A      Thank you,    Candie Garcia RN, BSN, [facility]  Professional   Glycemic Control Team   Phone:  [phone]  Tues - Thurs 8:30 - 4:30

## 2022-03-04 NOTE — PERIOP NOTES
Patient on insulin pump email sent to [employer] RN and Yari Serrano RN to update on use of insulin pump

## 2022-03-07 ENCOUNTER — ANESTHESIA EVENT (OUTPATIENT)
Dept: SURGERY | Age: 57
End: 2022-03-07
Payer: COMMERCIAL

## 2022-03-07 RX ORDER — FLUMAZENIL 0.1 MG/ML
0.2 INJECTION INTRAVENOUS
Status: CANCELLED | OUTPATIENT
Start: 2022-03-07

## 2022-03-07 RX ORDER — SODIUM CHLORIDE 0.9 % (FLUSH) 0.9 %
5-40 SYRINGE (ML) INJECTION EVERY 8 HOURS
Status: CANCELLED | OUTPATIENT
Start: 2022-03-07

## 2022-03-07 RX ORDER — NALOXONE HYDROCHLORIDE 0.4 MG/ML
0.1 INJECTION, SOLUTION INTRAMUSCULAR; INTRAVENOUS; SUBCUTANEOUS AS NEEDED
Status: CANCELLED | OUTPATIENT
Start: 2022-03-07

## 2022-03-07 RX ORDER — SODIUM CHLORIDE, SODIUM LACTATE, POTASSIUM CHLORIDE, CALCIUM CHLORIDE 600; 310; 30; 20 MG/100ML; MG/100ML; MG/100ML; MG/100ML
1000 INJECTION, SOLUTION INTRAVENOUS CONTINUOUS
Status: CANCELLED | OUTPATIENT
Start: 2022-03-07

## 2022-03-07 RX ORDER — MAGNESIUM SULFATE 100 %
4 CRYSTALS MISCELLANEOUS AS NEEDED
Status: CANCELLED | OUTPATIENT
Start: 2022-03-07

## 2022-03-07 RX ORDER — SODIUM CHLORIDE 0.9 % (FLUSH) 0.9 %
5-40 SYRINGE (ML) INJECTION AS NEEDED
Status: CANCELLED | OUTPATIENT
Start: 2022-03-07

## 2022-03-07 NOTE — ANESTHESIA PREPROCEDURE EVALUATION
Relevant Problems   No relevant active problems       Anesthetic History   No history of anesthetic complications            Review of Systems / Medical History  Patient summary reviewed, nursing notes reviewed and pertinent labs reviewed    Pulmonary                   Neuro/Psych             Comments: parkinsons Cardiovascular                       GI/Hepatic/Renal                Endo/Other    Diabetes         Other Findings              Physical Exam    Airway  Mallampati: II  TM Distance: 4 - 6 cm  Neck ROM: normal range of motion   Mouth opening: Normal     Cardiovascular               Dental         Pulmonary                 Abdominal  GI exam deferred       Other Findings            Anesthetic Plan    ASA: 2  Anesthesia type: MAC          Induction: Intravenous  Anesthetic plan and risks discussed with: Patient      Insulin pump disconnected during procedure

## 2022-03-08 ENCOUNTER — ANESTHESIA (OUTPATIENT)
Dept: SURGERY | Age: 57
End: 2022-03-08
Payer: COMMERCIAL

## 2022-03-08 ENCOUNTER — HOSPITAL ENCOUNTER (OUTPATIENT)
Age: 57
Setting detail: OUTPATIENT SURGERY
Discharge: HOME | End: 2022-03-08
Attending: OPHTHALMOLOGY | Admitting: OPHTHALMOLOGY
Payer: COMMERCIAL

## 2022-03-08 VITALS
SYSTOLIC BLOOD PRESSURE: 119 MMHG | DIASTOLIC BLOOD PRESSURE: 50 MMHG | TEMPERATURE: 97.5 F | OXYGEN SATURATION: 96 % | RESPIRATION RATE: 16 BRPM | WEIGHT: 173.4 LBS | BODY MASS INDEX: 27.87 KG/M2 | HEART RATE: 63 BPM | HEIGHT: 66 IN

## 2022-03-08 PROBLEM — H53.8 BLURRED VISION: Status: RESOLVED | Noted: 2022-03-08 | Resolved: 2022-03-08

## 2022-03-08 PROBLEM — H53.8 BLURRED VISION: Status: ACTIVE | Noted: 2022-03-08

## 2022-03-08 LAB
GLUCOSE BLD STRIP.AUTO-MCNC: 190 MG/DL (ref 70–110)
GLUCOSE BLD STRIP.AUTO-MCNC: 214 MG/DL (ref 70–110)

## 2022-03-08 PROCEDURE — 76060000032 HC ANESTHESIA 0.5 TO 1 HR: Performed by: OPHTHALMOLOGY

## 2022-03-08 PROCEDURE — 74011250636 HC RX REV CODE- 250/636: Performed by: OPHTHALMOLOGY

## 2022-03-08 PROCEDURE — 77030006704 HC BLD OPHTH SLT ALCN -B: Performed by: OPHTHALMOLOGY

## 2022-03-08 PROCEDURE — 74011000250 HC RX REV CODE- 250: Performed by: OPHTHALMOLOGY

## 2022-03-08 PROCEDURE — V2632 POST CHMBR INTRAOCULAR LENS: HCPCS | Performed by: OPHTHALMOLOGY

## 2022-03-08 PROCEDURE — 76210000021 HC REC RM PH II 0.5 TO 1 HR: Performed by: OPHTHALMOLOGY

## 2022-03-08 PROCEDURE — 77030013340: Performed by: OPHTHALMOLOGY

## 2022-03-08 PROCEDURE — 82962 GLUCOSE BLOOD TEST: CPT

## 2022-03-08 PROCEDURE — 77030020782 HC GWN BAIR PAWS FLX 3M -B: Performed by: OPHTHALMOLOGY

## 2022-03-08 PROCEDURE — 2709999900 HC NON-CHARGEABLE SUPPLY: Performed by: OPHTHALMOLOGY

## 2022-03-08 PROCEDURE — 77030013389: Performed by: OPHTHALMOLOGY

## 2022-03-08 PROCEDURE — 76010000138 HC OR TIME 0.5 TO 1 HR: Performed by: OPHTHALMOLOGY

## 2022-03-08 PROCEDURE — 74011250636 HC RX REV CODE- 250/636: Performed by: ANESTHESIOLOGY

## 2022-03-08 PROCEDURE — 77030018837 HC SOL IRR OPTH ALCN -A: Performed by: OPHTHALMOLOGY

## 2022-03-08 DEVICE — ACRYSOF(R) IQ ASPHERIC NATURAL IOL, SINGLE-PIECE ACRYLIC FOLDABLE PCL, UV WITH BLUE LIGHTFILTER, 13.0MM LENGTH, 6.0MM ANTERIORASYMMETRIC BICONVEX OPTIC, PLANAR HAPTICS.
Type: IMPLANTABLE DEVICE | Site: EYE | Status: FUNCTIONAL
Brand: ACRYSOF®

## 2022-03-08 RX ORDER — INSULIN ASPART 100 [IU]/ML
INJECTION, SOLUTION INTRAVENOUS; SUBCUTANEOUS
COMMUNITY

## 2022-03-08 RX ORDER — TROPICAMIDE 10 MG/ML
1 SOLUTION/ DROPS OPHTHALMIC
Status: DISPENSED | OUTPATIENT
Start: 2022-03-08 | End: 2022-03-08

## 2022-03-08 RX ORDER — EPINEPHRINE 1 MG/ML
INJECTION, SOLUTION, CONCENTRATE INTRAVENOUS AS NEEDED
Status: DISCONTINUED | OUTPATIENT
Start: 2022-03-08 | End: 2022-03-08 | Stop reason: HOSPADM

## 2022-03-08 RX ORDER — PHENYLEPHRINE HYDROCHLORIDE 25 MG/ML
1 SOLUTION/ DROPS OPHTHALMIC
Status: DISPENSED | OUTPATIENT
Start: 2022-03-08 | End: 2022-03-08

## 2022-03-08 RX ORDER — OFLOXACIN 3 MG/ML
1 SOLUTION/ DROPS OPHTHALMIC
Status: DISCONTINUED | OUTPATIENT
Start: 2022-03-08 | End: 2022-03-08 | Stop reason: HOSPADM

## 2022-03-08 RX ORDER — MIDAZOLAM HYDROCHLORIDE 1 MG/ML
INJECTION, SOLUTION INTRAMUSCULAR; INTRAVENOUS AS NEEDED
Status: DISCONTINUED | OUTPATIENT
Start: 2022-03-08 | End: 2022-03-08 | Stop reason: HOSPADM

## 2022-03-08 RX ORDER — FENTANYL CITRATE 50 UG/ML
INJECTION, SOLUTION INTRAMUSCULAR; INTRAVENOUS AS NEEDED
Status: DISCONTINUED | OUTPATIENT
Start: 2022-03-08 | End: 2022-03-08 | Stop reason: HOSPADM

## 2022-03-08 RX ORDER — OFLOXACIN 3 MG/ML
1 SOLUTION/ DROPS OPHTHALMIC
Status: DISCONTINUED | OUTPATIENT
Start: 2022-03-08 | End: 2022-03-08

## 2022-03-08 RX ORDER — KETOROLAC TROMETHAMINE 5 MG/ML
1 SOLUTION OPHTHALMIC
Status: COMPLETED | OUTPATIENT
Start: 2022-03-08 | End: 2022-03-08

## 2022-03-08 RX ORDER — SODIUM CHLORIDE, SODIUM LACTATE, POTASSIUM CHLORIDE, CALCIUM CHLORIDE 600; 310; 30; 20 MG/100ML; MG/100ML; MG/100ML; MG/100ML
75 INJECTION, SOLUTION INTRAVENOUS CONTINUOUS
Status: DISCONTINUED | OUTPATIENT
Start: 2022-03-08 | End: 2022-03-08 | Stop reason: HOSPADM

## 2022-03-08 RX ADMIN — KETOROLAC TROMETHAMINE 1 DROP: 0.5 SOLUTION OPHTHALMIC at 06:12

## 2022-03-08 RX ADMIN — KETOROLAC TROMETHAMINE 1 DROP: 0.5 SOLUTION OPHTHALMIC at 06:18

## 2022-03-08 RX ADMIN — PHENYLEPHRINE HYDROCHLORIDE 1 DROP: 2.5 SOLUTION/ DROPS OPHTHALMIC at 06:12

## 2022-03-08 RX ADMIN — MIDAZOLAM 0.5 MG: 1 INJECTION INTRAMUSCULAR; INTRAVENOUS at 08:01

## 2022-03-08 RX ADMIN — PHENYLEPHRINE HYDROCHLORIDE 1 DROP: 2.5 SOLUTION/ DROPS OPHTHALMIC at 06:18

## 2022-03-08 RX ADMIN — FENTANYL CITRATE 25 MCG: 50 INJECTION, SOLUTION INTRAMUSCULAR; INTRAVENOUS at 07:53

## 2022-03-08 RX ADMIN — PHENYLEPHRINE HYDROCHLORIDE 1 DROP: 2.5 SOLUTION/ DROPS OPHTHALMIC at 06:31

## 2022-03-08 RX ADMIN — FENTANYL CITRATE 25 MCG: 50 INJECTION, SOLUTION INTRAMUSCULAR; INTRAVENOUS at 07:47

## 2022-03-08 RX ADMIN — MIDAZOLAM 0.5 MG: 1 INJECTION INTRAMUSCULAR; INTRAVENOUS at 07:51

## 2022-03-08 RX ADMIN — MIDAZOLAM 0.5 MG: 1 INJECTION INTRAMUSCULAR; INTRAVENOUS at 08:10

## 2022-03-08 RX ADMIN — LIDOCAINE HYDROCHLORIDE 2 DROP: 35 GEL OPHTHALMIC at 06:32

## 2022-03-08 RX ADMIN — KETOROLAC TROMETHAMINE 1 DROP: 0.5 SOLUTION OPHTHALMIC at 06:31

## 2022-03-08 RX ADMIN — TROPICAMIDE 1 DROP: 10 SOLUTION/ DROPS OPHTHALMIC at 06:31

## 2022-03-08 RX ADMIN — TROPICAMIDE 1 DROP: 10 SOLUTION/ DROPS OPHTHALMIC at 06:12

## 2022-03-08 RX ADMIN — OFLOXACIN 1 DROP: 3 SOLUTION/ DROPS OPHTHALMIC at 06:12

## 2022-03-08 RX ADMIN — PHENYLEPHRINE HYDROCHLORIDE 1 DROP: 2.5 SOLUTION/ DROPS OPHTHALMIC at 06:23

## 2022-03-08 RX ADMIN — MIDAZOLAM 2 MG: 1 INJECTION INTRAMUSCULAR; INTRAVENOUS at 07:38

## 2022-03-08 RX ADMIN — SODIUM CHLORIDE, SODIUM LACTATE, POTASSIUM CHLORIDE, AND CALCIUM CHLORIDE 75 ML/HR: 600; 310; 30; 20 INJECTION, SOLUTION INTRAVENOUS at 06:30

## 2022-03-08 RX ADMIN — FENTANYL CITRATE 25 MCG: 50 INJECTION, SOLUTION INTRAMUSCULAR; INTRAVENOUS at 07:51

## 2022-03-08 RX ADMIN — MIDAZOLAM 0.5 MG: 1 INJECTION INTRAMUSCULAR; INTRAVENOUS at 07:56

## 2022-03-08 RX ADMIN — TROPICAMIDE 1 DROP: 10 SOLUTION/ DROPS OPHTHALMIC at 06:18

## 2022-03-08 RX ADMIN — TROPICAMIDE 1 DROP: 10 SOLUTION/ DROPS OPHTHALMIC at 06:23

## 2022-03-08 RX ADMIN — KETOROLAC TROMETHAMINE 1 DROP: 0.5 SOLUTION OPHTHALMIC at 06:23

## 2022-03-08 RX ADMIN — FENTANYL CITRATE 25 MCG: 50 INJECTION, SOLUTION INTRAMUSCULAR; INTRAVENOUS at 07:45

## 2022-03-08 NOTE — ANESTHESIA POSTPROCEDURE EVALUATION
Procedure(s):  CATARACT EXTRACTION WITH INTRA OCULAR LENS IMPLANT - RIGHT EYE . MAC    Anesthesia Post Evaluation        Comments: Post-Anesthesia Evaluation and Assessment    Cardiovascular Function/Vital Signs  BP (!) 119/50   Pulse 63   Temp 36.4 °C (97.5 °F)   Resp 16   Ht 5' 6\" (1.676 m)   Wt 78.7 kg (173 lb 6.4 oz)   SpO2 96%   BMI 27.99 kg/m²     Patient is status post Procedure(s):  CATARACT EXTRACTION WITH INTRA OCULAR LENS IMPLANT - RIGHT EYE . Nausea/Vomiting: Controlled. Postoperative hydration reviewed and adequate. Pain:  Pain Scale 1: Numeric (0 - 10) (03/08/22 3541)  Pain Intensity 1: 1 (03/08/22 0993)   Managed. Neurological Status:   Neuro (WDL): Within Defined Limits (03/08/22 0822)   At baseline. Mental Status and Level of Consciousness: Arousable. Pulmonary Status:   O2 Device: None (Room air) (03/08/22 8638)   Adequate oxygenation and airway patent. Complications related to anesthesia: None    Post-anesthesia assessment completed. No concerns. Patient has met all discharge requirements.     Signed By: Albin Baca MD    March 8, 2022                   INITIAL Post-op Vital signs:   Vitals Value Taken Time   /50 03/08/22 0844   Temp 36.4 °C (97.5 °F) 03/08/22 0822   Pulse 63 03/08/22 0844   Resp 16 03/08/22 0844   SpO2 96 % 03/08/22 0844

## 2022-03-08 NOTE — INTERVAL H&P NOTE
Update History & Physical    The Patient's History and Physical of March 8, 2022 was reviewed with the patient and I examined the patient. There was no change. The surgical site was confirmed by the patient and me. Plan:  The risk, benefits, expected outcome, and alternative to the recommended procedure have been discussed with the patient. Patient understands and wants to proceed with the procedure.     Electronically signed by Moises Pan MD on 3/8/2022 at 7:25 AM

## 2022-03-08 NOTE — PERIOP NOTES
PICC Placement Note    PRE-PROCEDURE VERIFICATION  Correct Procedure: yes  Correct Site:  yes  Temperature: Temp: 98.4 °F (36.9 °C), Temperature Source: Temp Source: Oral  Recent Labs     11/20/20  0454   BUN 19   CREA 0.89   *   WBC 7.2     Allergies: Pcn [penicillins] and Codeine  Education materials for PICC Care given: yes. See Patient Education activity for further details. PICC Booklet placed at bedside: yes    Closed Ended PICC Catheters:  Flush Lumens as Follows:  Intermittent Medication:   Flush before and after each medication with 10 ml NS. Unused Ports:  Flush every 8 hours with 10 ml NS.  TPN Ports:  Flush every 24 hours with 20 ml NS prior to hanging new bag. Blood Draws: Stop infusion, draw off and waste 10 ml of blood. Draw sample with 10cc syringe or greater. DO NOT USE VACUTAINER . Transfer with appropriate device to lab  tubes. Flush with 20 ml NS. Dressing Change:  Every 7 days, and PRN using sterile technique if integrity of dressing is compromised. Initial dressing change for central line 24-48 hours post insertion if gauze is used. Apply new dressing per policy. PROCEDURE DETAIL  Consent was obtained and all questions were answered related to risks and benefits. A triple lumen PICC line was inserted, as a sterile procedure using ultrasound and modified Seldinger technique for TPN. The following documentation is in addition to the PICC properties in the lines/airways flowsheet :  Lot #: [id number]  Lidocaine 1% administered intradermally :yes  Internal Catheter Total Length: 43cm with 2cm out  Vein Selection for PICC:right basilic  Central Line Bundle followed yes  Complication Related to Insertion:no    The placement was verified by EKG, MAX P WAVE @ 43cm with 2 cm out. PER EKG PICC TIP @ C/A junction.        Line is okay to use: yes    Patrick Cabrera RN Reviewed PTA medication list with patient/caregiver and patient/caregiver denies any additional medications. Patient admits to having a responsible adult care for them at home for at least 24 hours after surgery. Patient encouraged to use gown warming system and informed that using said warming gown to regulate body temperature prior to a procedure has been shown to help reduce the risks of blood clots and infection. Patient's pharmacy of choice verified and documented in PTA medication section. Dual skin assessment & fall risk band verification completed with Gage Mills RN. Pt denies having an active cough and confirms being able to lie still for 20 minutes.

## 2022-03-08 NOTE — DISCHARGE INSTRUCTIONS
Patient Education        Cataract Surgery: What to Expect at [address] [place] [address] Drive had cataract surgery. It replaced your cloudy natural lens with a clear artificial one. After surgery, your eye may feel scratchy, sticky, or uncomfortable. It may also water more than usual.  Most people see better 1 to 3 days after surgery. But it could take 3 to 10 weeks to get the full benefits of surgery and to see as clearly as possible. Your doctor may send you home with a bandage, patch, or clear shield on your eye. This will keep you from rubbing your eye. Your doctor will also give you eyedrops to help your eye heal. Use them exactly as directed. You can read or watch TV right away, but things may look blurry. Most people are able to return to work or their normal routine in 1 to 3 days. After your eye heals, you may still need to wear glasses, especially for reading. This care sheet gives you a general idea about how long it will take for you to recover. But each person recovers at a different pace. Follow the steps below to get better as quickly as possible. How can you care for yourself at home? Activity    · Rest when you feel tired. Getting enough sleep will help you recover.     · You may have trouble judging distances for a few days. Move slowly, and be careful going up and down stairs and pouring hot liquids. Ask for help if you need it.     · Ask your doctor when it is okay to drive.     · Wear your eye bandage, patch, or shield for as long as your doctor recommends. You may only need to wear it when you sleep.     · You can shower or wash your hair the day after surgery. Keep water, soap, shampoo, hair spray, and shaving lotion out of your eye, especially for the first week.     · Do not rub or put pressure on your eye for at least 1 week.     · Do not wear eye makeup for 1 to 2 weeks.  You may also want to avoid face cream or lotion.     · Do not get your hair colored or permed for 10 days after surgery.     · Do not bend over or do any strenuous activities, such as biking, jogging, weight lifting, or aerobic exercise, for 2 weeks or until your doctor says it is okay.     · Avoid swimming, hot tubs, gardening, and dusting for 1 to 2 weeks.     · Wear sunglasses on bright days for at least 1 year after surgery. Medicines    · Your doctor will tell you if and when you can restart your medicines. You will also be given instructions about taking any new medicines.     · If you take aspirin or some other blood thinner, ask your doctor if and when to start taking it again. Make sure that you understand exactly what your doctor wants you to do.     · Follow your doctor's instructions for when to use your eyedrops. Always wash your hands before you put your drops in. To put in eyedrops:  ? Tilt your head back, and pull your lower eyelid down with one finger. ? Drop or squirt the medicine inside the lower lid. ? Close your eye for 30 to 60 seconds to let the drops or ointment move around. ? Do not touch the ointment or dropper tip to your eyelashes or any other surface.     · Follow your doctor's instructions for taking pain medicines. Follow-up care is a key part of your treatment and safety. Be sure to make and go to all appointments, and call your doctor if you are having problems. It's also a good idea to know your test results and keep a list of the medicines you take. When should you call for help? Call 911 anytime you think you may need emergency care. For example, call if:    · You passed out (lost consciousness).     · You have a sudden loss of vision.     · You have sudden chest pain, are short of breath, or cough up blood. Call your doctor now or seek immediate medical care if:    · You have signs of an eye infection, such as:  ? Pus or thick discharge coming from the eye.  ? Redness or swelling around the eye.  ? A fever.     · You have new or worse eye pain.     · You have vision changes.   · You have symptoms of a blood clot in your leg (called a deep vein thrombosis), such as:  ? Pain in the calf, back of the knee, thigh, or groin. ? Redness and swelling in your leg or groin. Watch closely for changes in your health, and be sure to contact your doctor if:    · You do not get better as expected. Where can you learn more? Go to http://www.gray.com/  Enter R255 in the search box to learn more about \"Cataract Surgery: What to Expect at Home. \"  Current as of: April 29, 2021               Content Version: 13.0  © 2340-7392 [employer]. Care instructions adapted under license by [employer] (which disclaims liability or warranty for this information). If you have questions about a medical condition or this instruction, always ask your healthcare professional. [address] any warranty or liability for your use of this information. DISCHARGE SUMMARY from Nurse    PATIENT INSTRUCTIONS:    After general anesthesia or intravenous sedation, for 24 hours or while taking prescription Narcotics:  · Limit your activities  · Do not drive and operate hazardous machinery  · Do not make important personal or business decisions  · Do  not drink alcoholic beverages  · If you have not urinated within 8 hours after discharge, please contact your surgeon on call.     Report the following to your surgeon:  · Excessive pain, swelling, redness or odor of or around the surgical area  · Temperature over 100.5  · Nausea and vomiting lasting longer than 4 hours or if unable to take medications  · Any signs of decreased circulation or nerve impairment to extremity: change in color, persistent  numbness, tingling, coldness or increase pain  · Any questions    What to do at Home:  Enio De  TO [facility] ON Wednesday AS  SCHEDULED    If you experience any of the following symptoms bleeding, nausea, fevers, severe pain, please follow up with dr Robb Sage    *  Please give a list of your current medications to your Primary Care Provider. *  Please update this list whenever your medications are discontinued, doses are      changed, or new medications (including over-the-counter products) are added. *  Please carry medication information at all times in case of emergency situations. These are general instructions for a healthy lifestyle:    No smoking/ No tobacco products/ Avoid exposure to second hand smoke  Surgeon General's Warning:  Quitting smoking now greatly reduces serious risk to your health. Obesity, smoking, and sedentary lifestyle greatly increases your risk for illness    A healthy diet, regular physical exercise & weight monitoring are important for maintaining a healthy lifestyle    You may be retaining fluid if you have a history of heart failure or if you experience any of the following symptoms:  Weight gain of 3 pounds or more overnight or 5 pounds in a week, increased swelling in our hands or feet or shortness of breath while lying flat in bed. Please call your doctor as soon as you notice any of these symptoms; do not wait until your next office visit. The discharge information has been reviewed with the patient and spouse. The patient and spouse verbalized understanding. Discharge medications reviewed with the patient and spouse and appropriate educational materials and side effects teaching were provided.     ___________________________________________________________________________________________________________________________________  Patient armband removed and shredded

## 2022-03-08 NOTE — OP NOTES
Cataract Operative Note      Patient: Jessica Yuen               Sex: female          DOA: 3/8/2022         YOB: 1965      Age:  60 y.o.        LOS:  LOS: 0 days     Preoperative Diagnosis: Cataract right eye    Postoperative Diagnosis:  Cataract  right eye  Surgeon: Lyndon Louie MD, M.D. Anesthesia:  Topical anesthesia  Procedure:  Phacoemulsification of posterior chamber for intraocular lens implantation right    Fluids:  0    Procedure in Detail: The operative eye was prepped and draped in the usual fashion. A lid speculum was placed in the operative eye. A clear cornea approach was utilized. A paracentesis incision(s) was constructed with a 1 mm slit knife. One percent preservative-free lidocaine followed by viscoelastic was instilled into the anterior chamber. A clear corneal incision was made with a slit knife. A continuous curvilinear capsulorrhexis was constructed followed by hydrodissection. A phacoemulsification tip was placed into the eye, and the lens nucleus was emulsified. The irrigation/aspiration device was then used to remove any remaining cortical material.  Polishing of the capsule was performed as needed. The intraocular lens was then placed into the capsular bag after it was re-inflated with viscoelastic. The remaining viscoelastic was then removed using the irrigation/aspiration device. BSS on a cannula was then used to hydrate the wound edges. At the end of the procedure the wound was found to be watertight, the anterior chamber was deep and the pupil round. No blood loss during surgery. An antibiotic and anti-inflammatroy was placed into the operative eye. The lid speculum was removed. Protective sunglasses were then placed onto the patient. The patient was taken to the [address] [place] [address] Unit (PACU) in good condition having tolerated the procedure well. Estimated Blood Loss: 0                 Implants:   Implant Name Type Inv.  Item Serial No.  Lot No. LRB No. Used Action   LENS INTOCU 6.0 TO 30.0 DIOPT 118.7 A CONSTANT 0DEG ANG - [id number] Intraocular Lens LENS INTOCU 6.0 TO 30.0 DIOPT 118.7 A CONSTANT 0DEG ANG [id number] [employer]  Right 1 Implanted       Specimens: * No specimens in log *            Complications:  None           Lyndon Louie MD, M.D.  [unfilled]  8:17 AM

## 2022-03-08 NOTE — PERIOP NOTES
Decreasing dose back to 7.5 mg, discussed this with her during last appointment. It was temporarily increased to 10 mg due to acute injury months ago   Insulin pump to on per pt

## 2022-03-16 ENCOUNTER — HOSPITAL ENCOUNTER (OUTPATIENT)
Dept: PREADMISSION TESTING | Age: 57
Discharge: HOME | End: 2022-03-16

## 2022-03-16 VITALS — WEIGHT: 168 LBS | BODY MASS INDEX: 27 KG/M2 | HEIGHT: 66 IN

## 2022-03-16 NOTE — PERIOP NOTES
No sleep apnea, removable prosthetic devices or family history of malignant hyperthermia. PCP is not aware of the surgery. No participation in clinical trial or research study. Do not bring any valuables on DOS- jewelry, wallet, cash, laptop, medications. Does not meet criteria for special population at this time. Possible time delay day of surgery reviewed. Covid card- media. DNR status-none. Can lie flat for 30 minutes, no active coughing.

## 2022-03-22 ENCOUNTER — HOSPITAL ENCOUNTER (OUTPATIENT)
Age: 57
Setting detail: OUTPATIENT SURGERY
Discharge: HOME | End: 2022-03-22
Attending: OPHTHALMOLOGY | Admitting: OPHTHALMOLOGY
Payer: COMMERCIAL

## 2022-03-22 ENCOUNTER — ANESTHESIA (OUTPATIENT)
Dept: SURGERY | Age: 57
End: 2022-03-22
Payer: COMMERCIAL

## 2022-03-22 ENCOUNTER — ANESTHESIA EVENT (OUTPATIENT)
Dept: SURGERY | Age: 57
End: 2022-03-22
Payer: COMMERCIAL

## 2022-03-22 VITALS
SYSTOLIC BLOOD PRESSURE: 109 MMHG | BODY MASS INDEX: 28.12 KG/M2 | HEIGHT: 66 IN | WEIGHT: 175 LBS | HEART RATE: 69 BPM | OXYGEN SATURATION: 99 % | TEMPERATURE: 97.8 F | RESPIRATION RATE: 18 BRPM | DIASTOLIC BLOOD PRESSURE: 50 MMHG

## 2022-03-22 PROBLEM — H53.8 BLURRED VISION: Status: ACTIVE | Noted: 2022-03-22

## 2022-03-22 PROBLEM — H53.8 BLURRED VISION: Status: RESOLVED | Noted: 2022-03-22 | Resolved: 2022-03-22

## 2022-03-22 LAB
GLUCOSE BLD STRIP.AUTO-MCNC: 195 MG/DL (ref 70–110)
GLUCOSE BLD STRIP.AUTO-MCNC: 200 MG/DL (ref 70–110)
GLUCOSE BLD STRIP.AUTO-MCNC: 229 MG/DL (ref 70–110)

## 2022-03-22 PROCEDURE — 77030006704 HC BLD OPHTH SLT ALCN -B: Performed by: OPHTHALMOLOGY

## 2022-03-22 PROCEDURE — 76010000138 HC OR TIME 0.5 TO 1 HR: Performed by: OPHTHALMOLOGY

## 2022-03-22 PROCEDURE — 82962 GLUCOSE BLOOD TEST: CPT

## 2022-03-22 PROCEDURE — 77030018837 HC SOL IRR OPTH ALCN -A: Performed by: OPHTHALMOLOGY

## 2022-03-22 PROCEDURE — 74011250636 HC RX REV CODE- 250/636: Performed by: ANESTHESIOLOGY

## 2022-03-22 PROCEDURE — 76060000032 HC ANESTHESIA 0.5 TO 1 HR: Performed by: OPHTHALMOLOGY

## 2022-03-22 PROCEDURE — 77030020782 HC GWN BAIR PAWS FLX 3M -B: Performed by: OPHTHALMOLOGY

## 2022-03-22 PROCEDURE — 77030013340: Performed by: OPHTHALMOLOGY

## 2022-03-22 PROCEDURE — V2632 POST CHMBR INTRAOCULAR LENS: HCPCS | Performed by: OPHTHALMOLOGY

## 2022-03-22 PROCEDURE — 77030013389: Performed by: OPHTHALMOLOGY

## 2022-03-22 PROCEDURE — V2630 ANTER CHAMBER INTRAOCUL LENS: HCPCS | Performed by: OPHTHALMOLOGY

## 2022-03-22 PROCEDURE — 74011250636 HC RX REV CODE- 250/636: Performed by: OPHTHALMOLOGY

## 2022-03-22 PROCEDURE — 76210000021 HC REC RM PH II 0.5 TO 1 HR: Performed by: OPHTHALMOLOGY

## 2022-03-22 PROCEDURE — 74011000250 HC RX REV CODE- 250: Performed by: OPHTHALMOLOGY

## 2022-03-22 PROCEDURE — 2709999900 HC NON-CHARGEABLE SUPPLY: Performed by: OPHTHALMOLOGY

## 2022-03-22 DEVICE — ACRYSOF(R) IQ TORIC ASTIGMATISM IOL, SINGLE-PIECE ACRYLIC FOLDABLE LENS, UV WITH BLUE LIGHTFILTER, 13.0MM LENGTH, 6.0MM BICONVEX TORICASPHERIC OPTIC, 1.50 D CYLINDER.
Type: IMPLANTABLE DEVICE | Site: EYE | Status: FUNCTIONAL
Brand: ACRYSOF®

## 2022-03-22 RX ORDER — FLUMAZENIL 0.1 MG/ML
0.2 INJECTION INTRAVENOUS
Status: CANCELLED | OUTPATIENT
Start: 2022-03-22

## 2022-03-22 RX ORDER — KETOROLAC TROMETHAMINE 5 MG/ML
1 SOLUTION OPHTHALMIC
Status: COMPLETED | OUTPATIENT
Start: 2022-03-22 | End: 2022-03-22

## 2022-03-22 RX ORDER — NALOXONE HYDROCHLORIDE 0.4 MG/ML
0.1 INJECTION, SOLUTION INTRAMUSCULAR; INTRAVENOUS; SUBCUTANEOUS AS NEEDED
Status: CANCELLED | OUTPATIENT
Start: 2022-03-22

## 2022-03-22 RX ORDER — MIDAZOLAM HYDROCHLORIDE 1 MG/ML
INJECTION, SOLUTION INTRAMUSCULAR; INTRAVENOUS AS NEEDED
Status: DISCONTINUED | OUTPATIENT
Start: 2022-03-22 | End: 2022-03-22 | Stop reason: HOSPADM

## 2022-03-22 RX ORDER — MAGNESIUM SULFATE 100 %
4 CRYSTALS MISCELLANEOUS AS NEEDED
Status: CANCELLED | OUTPATIENT
Start: 2022-03-22

## 2022-03-22 RX ORDER — SODIUM CHLORIDE, SODIUM LACTATE, POTASSIUM CHLORIDE, CALCIUM CHLORIDE 600; 310; 30; 20 MG/100ML; MG/100ML; MG/100ML; MG/100ML
125 INJECTION, SOLUTION INTRAVENOUS CONTINUOUS
Status: DISCONTINUED | OUTPATIENT
Start: 2022-03-22 | End: 2022-03-22 | Stop reason: HOSPADM

## 2022-03-22 RX ORDER — SODIUM CHLORIDE 0.9 % (FLUSH) 0.9 %
5-40 SYRINGE (ML) INJECTION AS NEEDED
Status: CANCELLED | OUTPATIENT
Start: 2022-03-22

## 2022-03-22 RX ORDER — KETOROLAC TROMETHAMINE 5 MG/ML
1 SOLUTION OPHTHALMIC
Status: DISCONTINUED | OUTPATIENT
Start: 2022-03-22 | End: 2022-03-22

## 2022-03-22 RX ORDER — OFLOXACIN 3 MG/ML
1 SOLUTION/ DROPS OPHTHALMIC ONCE
Status: COMPLETED | OUTPATIENT
Start: 2022-03-22 | End: 2022-03-22

## 2022-03-22 RX ORDER — PHENYLEPHRINE HYDROCHLORIDE 25 MG/ML
1 SOLUTION/ DROPS OPHTHALMIC
Status: COMPLETED | OUTPATIENT
Start: 2022-03-22 | End: 2022-03-22

## 2022-03-22 RX ORDER — SODIUM CHLORIDE 0.9 % (FLUSH) 0.9 %
5-40 SYRINGE (ML) INJECTION EVERY 8 HOURS
Status: CANCELLED | OUTPATIENT
Start: 2022-03-22

## 2022-03-22 RX ORDER — EPINEPHRINE 1 MG/ML
INJECTION, SOLUTION, CONCENTRATE INTRAVENOUS AS NEEDED
Status: DISCONTINUED | OUTPATIENT
Start: 2022-03-22 | End: 2022-03-22 | Stop reason: HOSPADM

## 2022-03-22 RX ORDER — TROPICAMIDE 10 MG/ML
1 SOLUTION/ DROPS OPHTHALMIC
Status: COMPLETED | OUTPATIENT
Start: 2022-03-22 | End: 2022-03-22

## 2022-03-22 RX ORDER — DEXTROSE MONOHYDRATE 100 MG/ML
0-250 INJECTION, SOLUTION INTRAVENOUS AS NEEDED
Status: CANCELLED | OUTPATIENT
Start: 2022-03-22

## 2022-03-22 RX ORDER — FENTANYL CITRATE 50 UG/ML
INJECTION, SOLUTION INTRAMUSCULAR; INTRAVENOUS AS NEEDED
Status: DISCONTINUED | OUTPATIENT
Start: 2022-03-22 | End: 2022-03-22 | Stop reason: HOSPADM

## 2022-03-22 RX ORDER — SODIUM CHLORIDE, SODIUM LACTATE, POTASSIUM CHLORIDE, CALCIUM CHLORIDE 600; 310; 30; 20 MG/100ML; MG/100ML; MG/100ML; MG/100ML
1000 INJECTION, SOLUTION INTRAVENOUS CONTINUOUS
Status: CANCELLED | OUTPATIENT
Start: 2022-03-22

## 2022-03-22 RX ORDER — PROPARACAINE HYDROCHLORIDE 5 MG/ML
SOLUTION/ DROPS OPHTHALMIC AS NEEDED
Status: DISCONTINUED | OUTPATIENT
Start: 2022-03-22 | End: 2022-03-22 | Stop reason: HOSPADM

## 2022-03-22 RX ORDER — INSULIN LISPRO 100 [IU]/ML
INJECTION, SOLUTION INTRAVENOUS; SUBCUTANEOUS ONCE
Status: CANCELLED | OUTPATIENT
Start: 2022-03-22 | End: 2022-03-22

## 2022-03-22 RX ADMIN — MIDAZOLAM 2 MG: 1 INJECTION INTRAMUSCULAR; INTRAVENOUS at 09:48

## 2022-03-22 RX ADMIN — FENTANYL CITRATE 25 MCG: 50 INJECTION, SOLUTION INTRAMUSCULAR; INTRAVENOUS at 09:55

## 2022-03-22 RX ADMIN — FENTANYL CITRATE 25 MCG: 50 INJECTION, SOLUTION INTRAMUSCULAR; INTRAVENOUS at 09:52

## 2022-03-22 RX ADMIN — TROPICAMIDE 1 DROP: 10 SOLUTION/ DROPS OPHTHALMIC at 08:16

## 2022-03-22 RX ADMIN — OFLOXACIN 1 DROP: 3 SOLUTION/ DROPS OPHTHALMIC at 07:54

## 2022-03-22 RX ADMIN — PHENYLEPHRINE HYDROCHLORIDE 1 DROP: 2.5 SOLUTION/ DROPS OPHTHALMIC at 08:10

## 2022-03-22 RX ADMIN — PHENYLEPHRINE HYDROCHLORIDE 1 DROP: 2.5 SOLUTION/ DROPS OPHTHALMIC at 08:01

## 2022-03-22 RX ADMIN — MIDAZOLAM 0.5 MG: 1 INJECTION INTRAMUSCULAR; INTRAVENOUS at 10:19

## 2022-03-22 RX ADMIN — FENTANYL CITRATE 50 MCG: 50 INJECTION, SOLUTION INTRAMUSCULAR; INTRAVENOUS at 10:01

## 2022-03-22 RX ADMIN — MIDAZOLAM 1 MG: 1 INJECTION INTRAMUSCULAR; INTRAVENOUS at 10:05

## 2022-03-22 RX ADMIN — TROPICAMIDE 1 DROP: 10 SOLUTION/ DROPS OPHTHALMIC at 08:06

## 2022-03-22 RX ADMIN — KETOROLAC TROMETHAMINE 1 DROP: 0.5 SOLUTION OPHTHALMIC at 08:16

## 2022-03-22 RX ADMIN — KETOROLAC TROMETHAMINE 1 DROP: 0.5 SOLUTION OPHTHALMIC at 08:05

## 2022-03-22 RX ADMIN — TROPICAMIDE 1 DROP: 10 SOLUTION/ DROPS OPHTHALMIC at 08:10

## 2022-03-22 RX ADMIN — SODIUM CHLORIDE, SODIUM LACTATE, POTASSIUM CHLORIDE, AND CALCIUM CHLORIDE 125 ML/HR: 600; 310; 30; 20 INJECTION, SOLUTION INTRAVENOUS at 08:20

## 2022-03-22 RX ADMIN — PHENYLEPHRINE HYDROCHLORIDE 1 DROP: 2.5 SOLUTION/ DROPS OPHTHALMIC at 08:16

## 2022-03-22 RX ADMIN — PHENYLEPHRINE HYDROCHLORIDE 1 DROP: 2.5 SOLUTION/ DROPS OPHTHALMIC at 08:05

## 2022-03-22 RX ADMIN — KETOROLAC TROMETHAMINE 1 DROP: 0.5 SOLUTION OPHTHALMIC at 07:59

## 2022-03-22 RX ADMIN — TROPICAMIDE 1 DROP: 10 SOLUTION/ DROPS OPHTHALMIC at 08:00

## 2022-03-22 RX ADMIN — MIDAZOLAM 0.5 MG: 1 INJECTION INTRAMUSCULAR; INTRAVENOUS at 10:11

## 2022-03-22 RX ADMIN — KETOROLAC TROMETHAMINE 1 DROP: 0.5 SOLUTION OPHTHALMIC at 08:10

## 2022-03-22 NOTE — OP NOTES
Cataract Operative Note      Patient: Theodore Bravo               Sex: female          DOA: 3/22/2022         YOB: 1965      Age:  60 y.o.        LOS:  LOS: 0 days     Preoperative Diagnosis: Cataract left eye    Postoperative Diagnosis:  Cataract  left eye  Surgeon: Erika Neves MD, M.D. Anesthesia:  Topical anesthesia  Procedure:  Phacoemulsification of posterior chamber for intraocular lens implantation left    Fluids:  0    Procedure in Detail: The operative eye was prepped and draped in the usual fashion. A lid speculum was placed in the operative eye. A clear cornea approach was utilized. A paracentesis incision(s) was constructed with a 1 mm slit knife. One percent preservative-free lidocaine followed by viscoelastic was instilled into the anterior chamber. A clear corneal incision was made with a slit knife. A continuous curvilinear capsulorrhexis was constructed followed by hydrodissection. A phacoemulsification tip was placed into the eye, and the lens nucleus was emulsified. The irrigation/aspiration device was then used to remove any remaining cortical material.  Polishing of the capsule was performed as needed. The intraocular lens was then placed into the capsular bag after it was re-inflated with viscoelastic. The remaining viscoelastic was then removed using the irrigation/aspiration device. BSS on a cannula was then used to hydrate the wound edges. At the end of the procedure the wound was found to be watertight, the anterior chamber was deep and the pupil round. No blood loss during surgery. An antibiotic and anti-inflammatroy was placed into the operative eye. The lid speculum was removed. Protective sunglasses were then placed onto the patient. The patient was taken to the [address] [place] [address] Unit (PACU) in good condition having tolerated the procedure well.     Estimated Blood Loss: 0                 Implants: * No implants in log *    Specimens: * No specimens in log *            Complications:  None           Sorin Clemens MD , MLASHANDA.  [unfilled]  9:53 AM

## 2022-03-22 NOTE — PERIOP NOTES
Verbal and written discharge instructions given to patient and  with understanding confirmed verbally. Written instructions given to  to take home. Dr. Lacy Avalos signed off on anesthesia.

## 2022-03-22 NOTE — ANESTHESIA PREPROCEDURE EVALUATION
Relevant Problems   No relevant active problems       Anesthetic History   No history of anesthetic complications            Review of Systems / Medical History  Patient summary reviewed, nursing notes reviewed and pertinent labs reviewed    Pulmonary                   Neuro/Psych             Comments: parkinsons Cardiovascular                  Exercise tolerance: >4 METS     GI/Hepatic/Renal                Endo/Other    Diabetes    Obesity     Other Findings            Physical Exam    Airway  Mallampati: II  TM Distance: 4 - 6 cm  Neck ROM: normal range of motion   Mouth opening: Normal     Cardiovascular               Dental         Pulmonary                 Abdominal  GI exam deferred       Other Findings            Anesthetic Plan    ASA: 2  Anesthesia type: MAC          Induction: Intravenous  Anesthetic plan and risks discussed with: Patient      Insulin pump disconnected during procedure but patient bolused herself for glucose of 200

## 2022-03-22 NOTE — DISCHARGE INSTRUCTIONS
DISCHARGE SUMMARY from Nurse    PATIENT INSTRUCTIONS:    After general anesthesia or intravenous sedation, for 24 hours or while taking prescription Narcotics:  · Limit your activities  · Do not drive and operate hazardous machinery  · Do not make important personal or business decisions  · Do  not drink alcoholic beverages  · If you have not urinated within 8 hours after discharge, please contact your surgeon on call. Report the following to your surgeon:  · Excessive pain, swelling, redness or odor of or around the surgical area  · Temperature over 100.5  · Nausea and vomiting lasting longer than 4 hours or if unable to take medications  · Any signs of decreased circulation or nerve impairment to extremity: change in color, persistent  numbness, tingling, coldness or increase pain  · Any questions    What to do at Home:  Recommended activity:   No bending/lifting  No reading, computer use, crocheting, etc.  May watch TV, listen to radio, talk on the phone  Do NOT rub the eye or put pressure on the eye  Use the eye drops as instructed on the schedule from the office  Use eye shield when napping or sleeping  Bring eye drops to tomorrow's appointment    If you experience any of the following symptoms excessive pain, bleeding or discharge from the eye, please follow up with Dr. Doreen Ponce. *  Please give a list of your current medications to your Primary Care Provider. *  Please update this list whenever your medications are discontinued, doses are      changed, or new medications (including over-the-counter products) are added. *  Please carry medication information at all times in case of emergency situations. These are general instructions for a healthy lifestyle:    No smoking/ No tobacco products/ Avoid exposure to second hand smoke  Surgeon General's Warning:  Quitting smoking now greatly reduces serious risk to your health.     Obesity, smoking, and sedentary lifestyle greatly increases your risk for illness    A healthy diet, regular physical exercise & weight monitoring are important for maintaining a healthy lifestyle    You may be retaining fluid if you have a history of heart failure or if you experience any of the following symptoms:  Weight gain of 3 pounds or more overnight or 5 pounds in a week, increased swelling in our hands or feet or shortness of breath while lying flat in bed. Please call your doctor as soon as you notice any of these symptoms; do not wait until your next office visit. Patient armband removed and shredded    The discharge information has been reviewed with the patient and caregiver. The patient and caregiver verbalized understanding. Discharge medications reviewed with the patient and caregiver and appropriate educational materials and side effects teaching were provided.   ___________________________________________________________________________________________________________________________________

## 2022-03-22 NOTE — ANESTHESIA POSTPROCEDURE EVALUATION
Procedure(s):  CATARACT EXTRACTION WITH INTRA OCULAR LENS IMPLANT - LEFT EYE . MAC    Anesthesia Post Evaluation        Comments: Post-Anesthesia Evaluation and Assessment    Cardiovascular Function/Vital Signs  /60   Pulse (!) 59   Temp 36.6 °C (97.8 °F)   Resp 16   Ht 5' 6\" (1.676 m)   Wt 79.4 kg (175 lb)   SpO2 97%   BMI 28.25 kg/m²     Patient is status post Procedure(s):  CATARACT EXTRACTION WITH INTRA OCULAR LENS IMPLANT - LEFT EYE . Nausea/Vomiting: Controlled. Postoperative hydration reviewed and adequate. Pain:  Pain Scale 1: Numeric (0 - 10) (03/22/22 1034)  Pain Intensity 1: 0 (03/22/22 1034)   Managed. Neurological Status:   Neuro (WDL): Within Defined Limits (03/22/22 1034)   At baseline. Mental Status and Level of Consciousness: Arousable. Pulmonary Status:   O2 Device: None (Room air) (03/22/22 1034)   Adequate oxygenation and airway patent. Complications related to anesthesia: None    Post-anesthesia assessment completed. No concerns. Patient has met all discharge requirements.     Signed By: Holger Rangel MD    March 22, 2022                   INITIAL Post-op Vital signs:   Vitals Value Taken Time   /60 03/22/22 1034   Temp 36.6 °C (97.8 °F) 03/22/22 1034   Pulse 59 03/22/22 1034   Resp 16 03/22/22 1034   SpO2 97 % 03/22/22 1034

## 2022-03-22 NOTE — OP NOTES
Cataract Operative Note      Patient: August Saldivar               Sex: female          DOA: 3/22/2022         YOB: 1965      Age:  60 y.o.        LOS:  LOS: 0 days     Preoperative Diagnosis: Cataract left eye    Postoperative Diagnosis:  Cataract  left eye  Surgeon: Nick Wilder MD, M.D. Anesthesia:  Topical anesthesia  Procedure:  Phacoemulsification of posterior chamber for intraocular lens implantation left    Fluids:  0    Procedure in Detail: The operative eye was prepped and draped in the usual fashion. A lid speculum was placed in the operative eye. A clear cornea approach was utilized. A paracentesis incision(s) was constructed with a 1 mm slit knife. One percent preservative-free lidocaine followed by viscoelastic was instilled into the anterior chamber. A clear corneal incision was made with a slit knife. A continuous curvilinear capsulorrhexis was constructed followed by hydrodissection. A phacoemulsification tip was placed into the eye, and the lens nucleus was emulsified. The irrigation/aspiration device was then used to remove any remaining cortical material.  Polishing of the capsule was performed as needed. The intraocular lens was then placed into the capsular bag after it was re-inflated with viscoelastic. The remaining viscoelastic was then removed using the irrigation/aspiration device. BSS on a cannula was then used to hydrate the wound edges. At the end of the procedure the wound was found to be watertight, the anterior chamber was deep and the pupil round. No blood loss during surgery. An antibiotic and anti-inflammatroy was placed into the operative eye. The lid speculum was removed. Protective sunglasses were then placed onto the patient. The patient was taken to the [address] [place] [address] Unit (PACU) in good condition having tolerated the procedure well.     Estimated Blood Loss: 0                 Implants: * No implants in log *    Specimens: * No specimens in log *            Complications:  None           Magalys Ventura MD , MLASHANDA.  [unfilled]  9:58 AM

## 2022-03-22 NOTE — OP NOTES
Cataract Operative Note      Patient: Theron Ayala               Sex: female          DOA: 3/22/2022         YOB: 1965      Age:  60 y.o.        LOS:  LOS: 0 days     Preoperative Diagnosis: Cataract right eye    Postoperative Diagnosis:  Cataract  right eye  Surgeon: Boston Ramsey MD, M.D. Anesthesia:  Topical anesthesia  Procedure:  Phacoemulsification of posterior chamber for intraocular lens implantation right    Fluids:  0    Procedure in Detail: The operative eye was prepped and draped in the usual fashion. A lid speculum was placed in the operative eye. A clear cornea approach was utilized. A paracentesis incision(s) was constructed with a 1 mm slit knife. One percent preservative-free lidocaine followed by viscoelastic was instilled into the anterior chamber. A clear corneal incision was made with a slit knife. A continuous curvilinear capsulorrhexis was constructed followed by hydrodissection. A phacoemulsification tip was placed into the eye, and the lens nucleus was emulsified. The irrigation/aspiration device was then used to remove any remaining cortical material.  Polishing of the capsule was performed as needed. The intraocular lens was then placed into the capsular bag after it was re-inflated with viscoelastic. The remaining viscoelastic was then removed using the irrigation/aspiration device. BSS on a cannula was then used to hydrate the wound edges. At the end of the procedure the wound was found to be watertight, the anterior chamber was deep and the pupil round. No blood loss during surgery. An antibiotic and anti-inflammatroy was placed into the operative eye. The lid speculum was removed. Protective sunglasses were then placed onto the patient. The patient was taken to the [address] [place] [address] Unit (PACU) in good condition having tolerated the procedure well.     Estimated Blood Loss: 0                 Implants: * No implants in log *    Specimens: * No specimens in log *            Complications:  None           Sun Hayes MD , MLASHANDA.  [unfilled]  8:59 AM

## 2022-03-22 NOTE — PERIOP NOTES
Glucometer reading 229 after having some Diet Ginger Ale and 4 radha crackers. Had self administered Insulin via pump earlier today.

## 2022-03-22 NOTE — OP NOTES
Cataract Operative Note      Patient: Torie De Santiago               Sex: female          DOA: 3/22/2022         YOB: 1965      Age:  60 y.o.        LOS:  LOS: 0 days     Preoperative Diagnosis: Cataract left eye    Postoperative Diagnosis:  Cataract  left eye  Surgeon: Priyanka Adrian MD, M.D. Anesthesia:  Topical anesthesia  Procedure:  Phacoemulsification of posterior chamber for intraocular lens implantation left    Fluids:  0    Procedure in Detail: The operative eye was prepped and draped in the usual fashion. A lid speculum was placed in the operative eye. A clear cornea approach was utilized. A paracentesis incision(s) was constructed with a 1 mm slit knife. One percent preservative-free lidocaine followed by viscoelastic was instilled into the anterior chamber. A clear corneal incision was made with a slit knife. A continuous curvilinear capsulorrhexis was constructed followed by hydrodissection. A phacoemulsification tip was placed into the eye, and the lens nucleus was emulsified. The irrigation/aspiration device was then used to remove any remaining cortical material.  Polishing of the capsule was performed as needed. The intraocular lens was then placed into the capsular bag after it was re-inflated with viscoelastic. The remaining viscoelastic was then removed using the irrigation/aspiration device. BSS on a cannula was then used to hydrate the wound edges. At the end of the procedure the wound was found to be watertight, the anterior chamber was deep and the pupil round. No blood loss during surgery. An antibiotic and anti-inflammatroy was placed into the operative eye. The lid speculum was removed. Protective sunglasses were then placed onto the patient. The patient was taken to the [address] [place] [address] Unit (PACU) in good condition having tolerated the procedure well. Estimated Blood Loss: 0                 Implants:   Implant Name Type Inv.  Item Serial No.  Lot No. LRB No. Used Action   LENS INTOCU +6.0 TO +34.0 DIOPT CYL PWR 1.50 DIOPT L13MM - [id number] Intraocular Lens LENS INTOCU +6.0 TO +34.0 DIOPT CYL PWR 1.50 DIOPT L13MM [id number] [employer]  Left 1 Implanted       Specimens: * No specimens in log *            Complications:  None           Ava Johnson MD, M.D.  [unfilled]  11:13 AM

## 2022-03-22 NOTE — INTERVAL H&P NOTE
Update History & Physical    The Patient's History and Physical of March 22, 2022 was reviewed with the patient and I examined the patient. There was no change. The surgical site was confirmed by the patient and me. Plan:  The risk, benefits, expected outcome, and alternative to the recommended procedure have been discussed with the patient. Patient understands and wants to proceed with the procedure.     Electronically signed by Medina Garcia MD on 3/22/2022 at 8:25 AM

## 2022-03-22 NOTE — PERIOP NOTES
Dr Pilo Anderson notified of Bs 200 with orders noted. Patient bolus herself with 2 units own pump as ordered by Dr Pilo Anderson. Insulin pump disconnected after bolus given and given to  . BS to recheck at [phone] .  Jared Giron RN notified and amenable

## 2022-07-19 PROBLEM — E66.3 BMI OVER RECOMMENDED: Status: ACTIVE | Noted: 2017-08-09

## 2022-09-09 ENCOUNTER — AMBULATORY SURGICAL CENTER (OUTPATIENT)
Dept: URBAN - METROPOLITAN AREA SURGERY 4 | Facility: SURGERY | Age: 57
End: 2022-09-09

## 2022-09-09 VITALS
SYSTOLIC BLOOD PRESSURE: 124 MMHG | HEART RATE: 92 BPM | OXYGEN SATURATION: 99 % | HEART RATE: 75 BPM | RESPIRATION RATE: 12 BRPM | SYSTOLIC BLOOD PRESSURE: 120 MMHG | HEART RATE: 72 BPM | RESPIRATION RATE: 16 BRPM | RESPIRATION RATE: 12 BRPM | DIASTOLIC BLOOD PRESSURE: 57 MMHG | HEART RATE: 79 BPM | DIASTOLIC BLOOD PRESSURE: 111 MMHG | DIASTOLIC BLOOD PRESSURE: 96 MMHG | DIASTOLIC BLOOD PRESSURE: 91 MMHG | RESPIRATION RATE: 15 BRPM | TEMPERATURE: 98.3 F | SYSTOLIC BLOOD PRESSURE: 142 MMHG | HEART RATE: 84 BPM | HEART RATE: 70 BPM | DIASTOLIC BLOOD PRESSURE: 84 MMHG | TEMPERATURE: 98.3 F | DIASTOLIC BLOOD PRESSURE: 70 MMHG | HEART RATE: 84 BPM | OXYGEN SATURATION: 99 % | SYSTOLIC BLOOD PRESSURE: 126 MMHG | OXYGEN SATURATION: 94 % | RESPIRATION RATE: 13 BRPM | HEART RATE: 92 BPM | SYSTOLIC BLOOD PRESSURE: 126 MMHG | SYSTOLIC BLOOD PRESSURE: 156 MMHG | WEIGHT: 165 LBS | HEART RATE: 72 BPM | DIASTOLIC BLOOD PRESSURE: 100 MMHG | HEART RATE: 75 BPM | OXYGEN SATURATION: 100 % | DIASTOLIC BLOOD PRESSURE: 96 MMHG | SYSTOLIC BLOOD PRESSURE: 141 MMHG | OXYGEN SATURATION: 94 % | RESPIRATION RATE: 17 BRPM | OXYGEN SATURATION: 97 % | WEIGHT: 165 LBS | RESPIRATION RATE: 16 BRPM | SYSTOLIC BLOOD PRESSURE: 142 MMHG | DIASTOLIC BLOOD PRESSURE: 57 MMHG | RESPIRATION RATE: 15 BRPM | OXYGEN SATURATION: 98 % | SYSTOLIC BLOOD PRESSURE: 141 MMHG | OXYGEN SATURATION: 98 % | HEART RATE: 70 BPM | HEIGHT: 67 IN | DIASTOLIC BLOOD PRESSURE: 84 MMHG | RESPIRATION RATE: 17 BRPM | RESPIRATION RATE: 13 BRPM | DIASTOLIC BLOOD PRESSURE: 85 MMHG | HEART RATE: 79 BPM | DIASTOLIC BLOOD PRESSURE: 70 MMHG | DIASTOLIC BLOOD PRESSURE: 79 MMHG | SYSTOLIC BLOOD PRESSURE: 124 MMHG | OXYGEN SATURATION: 100 % | OXYGEN SATURATION: 97 % | SYSTOLIC BLOOD PRESSURE: 156 MMHG | DIASTOLIC BLOOD PRESSURE: 79 MMHG | DIASTOLIC BLOOD PRESSURE: 111 MMHG | DIASTOLIC BLOOD PRESSURE: 85 MMHG | DIASTOLIC BLOOD PRESSURE: 100 MMHG | DIASTOLIC BLOOD PRESSURE: 91 MMHG | SYSTOLIC BLOOD PRESSURE: 120 MMHG | HEIGHT: 67 IN

## 2022-09-09 DIAGNOSIS — Z90.49 ACQUIRED ABSENCE OF OTHER SPECIFIED PARTS OF DIGESTIVE TRACT: ICD-10-CM

## 2022-09-09 DIAGNOSIS — Z08 ENCOUNTER FOR FOLLOW-UP EXAMINATION AFTER COMPLETED TREATMEN: ICD-10-CM

## 2022-09-09 DIAGNOSIS — Z85.038 PERSONAL HISTORY OF OTHER MALIGNANT NEOPLASM OF LARGE INTEST: ICD-10-CM

## 2022-09-09 PROBLEM — Z12.11 COLON CANCER SCREENING (LOW/AVERAGE RISK): Status: ACTIVE | Noted: 2022-09-09

## 2022-09-09 PROCEDURE — 45378 DIAGNOSTIC COLONOSCOPY: CPT | Performed by: INTERNAL MEDICINE

## (undated) DEVICE — TRNQT TEXT 1X18IN BLU LF DISP -- CONVERT TO ITEM 362165

## (undated) DEVICE — SOLUTION IV 500ML 0.9% SOD CHL FLX CONT

## (undated) DEVICE — SATINSLIT® KNIFE 2.75MM ANGLED: Brand: SATINSLIT®

## (undated) DEVICE — SINGLE PORT MANIFOLD: Brand: NEPTUNE 2

## (undated) DEVICE — SPONGE GZ W4XL4IN RAYON POLY 4 PLY NONWOVEN FASTER WICKING

## (undated) DEVICE — SYR 5ML 1/5 GRAD LL NSAF LF --

## (undated) DEVICE — MICROSURGICAL INSTRUMENT HYDRODISSECTION CANNULA 27GA, 1.57MM BEND (AKAHOSHI STYLE): Brand: ALCON

## (undated) DEVICE — Device

## (undated) DEVICE — SYRINGE 50ML E/T

## (undated) DEVICE — CANNULA CUSH AD W/ 14FT TBG

## (undated) DEVICE — TUBING, SUCTION, 1/4" X 12', STRAIGHT: Brand: MEDLINE

## (undated) DEVICE — SOLUTION IRRIGATION BAL SALT SOLUTION 500 ML STRL BSS

## (undated) DEVICE — SOLUTION IRRIG 1000ML H2O STRL BLT

## (undated) DEVICE — CANN VISCOFLOW FRM 27G 22MM --

## (undated) DEVICE — NDL PRT INJ NSAF BLNT 18GX1.5 --

## (undated) DEVICE — NDL FLTR TIP 5 MIC 18GX1.5IN --

## (undated) DEVICE — SNARE POLYP SM W13MMXL240CM SHTH DIA2.4MM OVL FLX DISP

## (undated) DEVICE — 3M(TM) TEGADERM(TM) TRANSPARENT FILM DRESSING FRAME STYLE 9505W: Brand: 3M™ TEGADERM™

## (undated) DEVICE — TRAP SPEC COLL POLYP POLYSTYR --

## (undated) DEVICE — SPONGE GZ W4XL4IN COT 12 PLY TYP VII WVN C FLD DSGN

## (undated) DEVICE — WRISTBAND ID AD W2.5XL9.5CM RED VYN ADH CLSR UNI-PRINT

## (undated) DEVICE — GARMENT,MEDLINE,DVT,INT,CALF,MED, GEN2: Brand: MEDLINE

## (undated) DEVICE — KENDALL RADIOLUCENT FOAM MONITORING ELECTRODE RECTANGULAR SHAPE: Brand: KENDALL

## (undated) DEVICE — CATH IV SAFE STR 22GX1IN BLU -- PROTECTIV PLUS

## (undated) DEVICE — CATH SUC CTRL PRT TRIFLO 14FR --

## (undated) DEVICE — SYR 3ML LL TIP 1/10ML GRAD --

## (undated) DEVICE — MAJ-1414 SINGLE USE ADPATER BIOPSY VALV: Brand: SINGLE USE ADAPTOR BIOPSY VALVE

## (undated) DEVICE — STRAP,POSITIONING,KNEE/BODY,FOAM,4X60": Brand: MEDLINE

## (undated) DEVICE — SET ADMIN 16ML TBNG L100IN 2 Y INJ SITE IV PIGGY BK DISP